# Patient Record
Sex: FEMALE | Race: WHITE | NOT HISPANIC OR LATINO | URBAN - METROPOLITAN AREA
[De-identification: names, ages, dates, MRNs, and addresses within clinical notes are randomized per-mention and may not be internally consistent; named-entity substitution may affect disease eponyms.]

---

## 2019-12-08 ENCOUNTER — EMERGENCY (EMERGENCY)
Facility: HOSPITAL | Age: 64
LOS: 1 days | Discharge: HOME | End: 2019-12-08
Attending: EMERGENCY MEDICINE | Admitting: EMERGENCY MEDICINE
Payer: MEDICAID

## 2019-12-08 VITALS
RESPIRATION RATE: 18 BRPM | OXYGEN SATURATION: 97 % | SYSTOLIC BLOOD PRESSURE: 187 MMHG | DIASTOLIC BLOOD PRESSURE: 99 MMHG | TEMPERATURE: 98 F | HEART RATE: 87 BPM

## 2019-12-08 VITALS
DIASTOLIC BLOOD PRESSURE: 82 MMHG | HEART RATE: 83 BPM | TEMPERATURE: 99 F | RESPIRATION RATE: 17 BRPM | SYSTOLIC BLOOD PRESSURE: 162 MMHG | OXYGEN SATURATION: 98 %

## 2019-12-08 LAB
ALBUMIN SERPL ELPH-MCNC: 3.6 G/DL — SIGNIFICANT CHANGE UP (ref 3.3–5)
ALP SERPL-CCNC: 117 U/L — SIGNIFICANT CHANGE UP (ref 40–120)
ALT FLD-CCNC: 16 U/L — SIGNIFICANT CHANGE UP (ref 10–45)
ANION GAP SERPL CALC-SCNC: 11 MMOL/L — SIGNIFICANT CHANGE UP (ref 5–17)
AST SERPL-CCNC: 15 U/L — SIGNIFICANT CHANGE UP (ref 10–40)
BASOPHILS # BLD AUTO: 0.06 K/UL — SIGNIFICANT CHANGE UP (ref 0–0.2)
BASOPHILS NFR BLD AUTO: 0.5 % — SIGNIFICANT CHANGE UP (ref 0–2)
BILIRUB SERPL-MCNC: <0.2 MG/DL — SIGNIFICANT CHANGE UP (ref 0.2–1.2)
BUN SERPL-MCNC: 23 MG/DL — SIGNIFICANT CHANGE UP (ref 7–23)
CALCIUM SERPL-MCNC: 9.2 MG/DL — SIGNIFICANT CHANGE UP (ref 8.4–10.5)
CHLORIDE SERPL-SCNC: 109 MMOL/L — HIGH (ref 96–108)
CO2 SERPL-SCNC: 26 MMOL/L — SIGNIFICANT CHANGE UP (ref 22–31)
CREAT SERPL-MCNC: 0.41 MG/DL — LOW (ref 0.5–1.3)
EOSINOPHIL # BLD AUTO: 0.14 K/UL — SIGNIFICANT CHANGE UP (ref 0–0.5)
EOSINOPHIL NFR BLD AUTO: 1.2 % — SIGNIFICANT CHANGE UP (ref 0–6)
GLUCOSE SERPL-MCNC: 109 MG/DL — HIGH (ref 70–99)
HCT VFR BLD CALC: 37.5 % — SIGNIFICANT CHANGE UP (ref 34.5–45)
HGB BLD-MCNC: 11.5 G/DL — SIGNIFICANT CHANGE UP (ref 11.5–15.5)
IMM GRANULOCYTES NFR BLD AUTO: 0.3 % — SIGNIFICANT CHANGE UP (ref 0–1.5)
LYMPHOCYTES # BLD AUTO: 2.48 K/UL — SIGNIFICANT CHANGE UP (ref 1–3.3)
LYMPHOCYTES # BLD AUTO: 20.6 % — SIGNIFICANT CHANGE UP (ref 13–44)
MCHC RBC-ENTMCNC: 26.1 PG — LOW (ref 27–34)
MCHC RBC-ENTMCNC: 30.7 GM/DL — LOW (ref 32–36)
MCV RBC AUTO: 85.2 FL — SIGNIFICANT CHANGE UP (ref 80–100)
MONOCYTES # BLD AUTO: 0.97 K/UL — HIGH (ref 0–0.9)
MONOCYTES NFR BLD AUTO: 8.1 % — SIGNIFICANT CHANGE UP (ref 2–14)
NEUTROPHILS # BLD AUTO: 8.33 K/UL — HIGH (ref 1.8–7.4)
NEUTROPHILS NFR BLD AUTO: 69.3 % — SIGNIFICANT CHANGE UP (ref 43–77)
NRBC # BLD: 0 /100 WBCS — SIGNIFICANT CHANGE UP (ref 0–0)
PLATELET # BLD AUTO: 282 K/UL — SIGNIFICANT CHANGE UP (ref 150–400)
POTASSIUM SERPL-MCNC: 3.9 MMOL/L — SIGNIFICANT CHANGE UP (ref 3.5–5.3)
POTASSIUM SERPL-SCNC: 3.9 MMOL/L — SIGNIFICANT CHANGE UP (ref 3.5–5.3)
PROT SERPL-MCNC: 6.3 G/DL — SIGNIFICANT CHANGE UP (ref 6–8.3)
RBC # BLD: 4.4 M/UL — SIGNIFICANT CHANGE UP (ref 3.8–5.2)
RBC # FLD: 14.1 % — SIGNIFICANT CHANGE UP (ref 10.3–14.5)
SODIUM SERPL-SCNC: 146 MMOL/L — HIGH (ref 135–145)
TROPONIN T SERPL-MCNC: <0.01 NG/ML — SIGNIFICANT CHANGE UP (ref 0–0.01)
WBC # BLD: 12.02 K/UL — HIGH (ref 3.8–10.5)
WBC # FLD AUTO: 12.02 K/UL — HIGH (ref 3.8–10.5)

## 2019-12-08 PROCEDURE — 84484 ASSAY OF TROPONIN QUANT: CPT

## 2019-12-08 PROCEDURE — 71046 X-RAY EXAM CHEST 2 VIEWS: CPT | Mod: 26

## 2019-12-08 PROCEDURE — 93010 ELECTROCARDIOGRAM REPORT: CPT

## 2019-12-08 PROCEDURE — 71046 X-RAY EXAM CHEST 2 VIEWS: CPT

## 2019-12-08 PROCEDURE — 85025 COMPLETE CBC W/AUTO DIFF WBC: CPT

## 2019-12-08 PROCEDURE — 80053 COMPREHEN METABOLIC PANEL: CPT

## 2019-12-08 PROCEDURE — 93005 ELECTROCARDIOGRAM TRACING: CPT

## 2019-12-08 PROCEDURE — 99283 EMERGENCY DEPT VISIT LOW MDM: CPT | Mod: 25

## 2019-12-08 PROCEDURE — 36415 COLL VENOUS BLD VENIPUNCTURE: CPT

## 2019-12-08 PROCEDURE — 99285 EMERGENCY DEPT VISIT HI MDM: CPT

## 2019-12-08 NOTE — ED ADULT NURSE NOTE - OBJECTIVE STATEMENT
Received a 64 year old female with a chief complaint of shortness of breath. Patient presents to the Ed in no acute distress. Patient denies chest pain.

## 2019-12-08 NOTE — ED ADULT NURSE NOTE - NSIMPLEMENTINTERV_GEN_ALL_ED
Implemented All Universal Safety Interventions:  [place] to call system. Call bell, personal items and telephone within reach. Instruct patient to call for assistance. Room bathroom lighting operational. Non-slip footwear when patient is off stretcher. Physically safe environment: no spills, clutter or unnecessary equipment. Stretcher in lowest position, wheels locked, appropriate side rails in place.

## 2019-12-08 NOTE — ED ADULT TRIAGE NOTE - CHIEF COMPLAINT QUOTE
pt. c/o shortness of breath that woke her up tonight, denies chest pain, acute extremities swelling, fever, cough, dizziness. BP elevated in triage, pt. missed her evening amlodipine dose.

## 2019-12-08 NOTE — ED PROVIDER NOTE - CLINICAL SUMMARY MEDICAL DECISION MAKING FREE TEXT BOX
64F PMh chronic back pain, possible HTN (was prescribed amlodpine but then told to stop taking it a few mos ago) p/w SOB. Was feeling like usual self prior to going to sleep. Awoke in middle of night gasping for breath, lasted a few min and quickly resolved. Went back to sleep and had a similar episode. Currently asymptomatic. Was told a few mos ago that she may have sleep apnea, snores at night. Had routine stress test ~7mos ago wnl. No other systemic symptoms. Hypertensive, other vitals wnl. Exam as above.  ddx: Likely sleep apnea. Clinically not acute cardiac pathology.   cbc/cmp/trop sent prior to my eval. CXR.  Reassess.

## 2019-12-08 NOTE — ED PROVIDER NOTE - PROGRESS NOTE DETAILS
Klepfish: Pt remains asymptomatic. Labs grossly wnl. CXR wnl. Clinically no indication for further emergent ED workup or hospitalization at this time. comfortable for dc, outpt pmd/cards/pulm f/u.

## 2019-12-08 NOTE — ED PROVIDER NOTE - OBJECTIVE STATEMENT
64F PMh chronic back pain, possible HTN (was prescribed amlodpine but then told to stop taking it a few mos ago) p/w SOB. Was feeling like usual self prior to going to sleep. Awoke in middle of night gasping for breath, lasted a few min and quickly resolved. Went back to sleep and had a similar episode. Currently asymptomatic. Was told a few mos ago that she may have sleep apnea, snores at night. Had routine stress test ~7mos ago wnl. Denies associated cp, NVD, lightheaded, diaphoresis, palpitations, cough/rhinorrhea, black/bloody stool, LE pain/swelling, focal weakness/numbness, recent travel/immobilization, abd pain, urinary complaints, f/c. No hormone use. No FMH CAD/clots/sudden death.

## 2019-12-08 NOTE — ED PROVIDER NOTE - NSFOLLOWUPINSTRUCTIONS_ED_ALL_ED_FT
Return for fevers, persistent vomit, uncontrolled pain, worsening breathing, worsening lightheaded.  Follow up with primary doctor within 1-2 days.   Follow up with cardiologist. Can call [phone] ([facility]) to schedule appointment.   Follow up with pulmonologist. Can call [phone] to schedule appointment.     Shortness of breath    Shortness of breath (dyspnea) means you have trouble breathing and could indicate a medical problem. Causes include lung disease, heart disease, low amount of red blood cells (anemia), poor physical fitness, being overweight, smoking, etc. Your health care provider today may not be able to find a cause for your shortness of breath after your exam. In this case, it is important to have a follow-up exam with your primary care physician as instructed. If medicines were prescribed, take them as directed for the full length of time directed. Refrain from tobacco products.    SEEK IMMEDIATE MEDICAL CARE IF YOU HAVE ANY OF THE FOLLOWING SYMPTOMS: worsening shortness of breath, chest pain, back pain, abdominal pain, fever, coughing up blood, lightheadedness/dizziness.

## 2019-12-08 NOTE — ED PROVIDER NOTE - PATIENT PORTAL LINK FT
You can access the FollowMyHealth Patient Portal offered by [facility] by registering at the following website: [email or web address]. By joining [facility]’s FollowMyHealth portal, you will also be able to view your health information using other applications (apps) compatible with our system.

## 2019-12-12 DIAGNOSIS — R06.02 SHORTNESS OF BREATH: ICD-10-CM

## 2022-04-20 ENCOUNTER — INPATIENT (INPATIENT)
Facility: HOSPITAL | Age: 67
LOS: 1 days | Discharge: LEFT BEFORE DISCHARGE | DRG: 872 | End: 2022-04-22
Attending: SURGERY | Admitting: SURGERY
Payer: MEDICARE

## 2022-04-20 VITALS
OXYGEN SATURATION: 95 % | TEMPERATURE: 98 F | DIASTOLIC BLOOD PRESSURE: 92 MMHG | RESPIRATION RATE: 16 BRPM | WEIGHT: 169.98 LBS | HEIGHT: 67 IN | HEART RATE: 97 BPM | SYSTOLIC BLOOD PRESSURE: 173 MMHG

## 2022-04-20 LAB
ALBUMIN SERPL ELPH-MCNC: 3.4 G/DL — SIGNIFICANT CHANGE UP (ref 3.3–5)
ALBUMIN SERPL ELPH-MCNC: 3.9 G/DL — SIGNIFICANT CHANGE UP (ref 3.3–5)
ALP SERPL-CCNC: 335 U/L — HIGH (ref 40–120)
ALP SERPL-CCNC: 376 U/L — HIGH (ref 40–120)
ALT FLD-CCNC: 322 U/L — HIGH (ref 10–45)
ALT FLD-CCNC: 438 U/L — HIGH (ref 10–45)
ANION GAP SERPL CALC-SCNC: 12 MMOL/L — SIGNIFICANT CHANGE UP (ref 5–17)
ANION GAP SERPL CALC-SCNC: 12 MMOL/L — SIGNIFICANT CHANGE UP (ref 5–17)
APPEARANCE UR: CLEAR — SIGNIFICANT CHANGE UP
APTT BLD: 33.4 SEC — SIGNIFICANT CHANGE UP (ref 27.5–35.5)
AST SERPL-CCNC: 231 U/L — HIGH (ref 10–40)
AST SERPL-CCNC: 370 U/L — HIGH (ref 10–40)
BACTERIA # UR AUTO: ABNORMAL /HPF
BASOPHILS # BLD AUTO: 0.03 K/UL — SIGNIFICANT CHANGE UP (ref 0–0.2)
BASOPHILS NFR BLD AUTO: 0.2 % — SIGNIFICANT CHANGE UP (ref 0–2)
BILIRUB SERPL-MCNC: 3.4 MG/DL — HIGH (ref 0.2–1.2)
BILIRUB SERPL-MCNC: 4.1 MG/DL — HIGH (ref 0.2–1.2)
BILIRUB UR-MCNC: ABNORMAL
BLD GP AB SCN SERPL QL: NEGATIVE — SIGNIFICANT CHANGE UP
BUN SERPL-MCNC: 12 MG/DL — SIGNIFICANT CHANGE UP (ref 7–23)
BUN SERPL-MCNC: 13 MG/DL — SIGNIFICANT CHANGE UP (ref 7–23)
CALCIUM SERPL-MCNC: 8.4 MG/DL — SIGNIFICANT CHANGE UP (ref 8.4–10.5)
CALCIUM SERPL-MCNC: 9.4 MG/DL — SIGNIFICANT CHANGE UP (ref 8.4–10.5)
CHLORIDE SERPL-SCNC: 101 MMOL/L — SIGNIFICANT CHANGE UP (ref 96–108)
CHLORIDE SERPL-SCNC: 99 MMOL/L — SIGNIFICANT CHANGE UP (ref 96–108)
CO2 SERPL-SCNC: 22 MMOL/L — SIGNIFICANT CHANGE UP (ref 22–31)
CO2 SERPL-SCNC: 26 MMOL/L — SIGNIFICANT CHANGE UP (ref 22–31)
COLOR SPEC: YELLOW — SIGNIFICANT CHANGE UP
COMMENT - URINE: SIGNIFICANT CHANGE UP
CREAT SERPL-MCNC: 0.49 MG/DL — LOW (ref 0.5–1.3)
CREAT SERPL-MCNC: 0.52 MG/DL — SIGNIFICANT CHANGE UP (ref 0.5–1.3)
DIFF PNL FLD: NEGATIVE — SIGNIFICANT CHANGE UP
EGFR: 103 ML/MIN/1.73M2 — SIGNIFICANT CHANGE UP
EGFR: 105 ML/MIN/1.73M2 — SIGNIFICANT CHANGE UP
EOSINOPHIL # BLD AUTO: 0.02 K/UL — SIGNIFICANT CHANGE UP (ref 0–0.5)
EOSINOPHIL NFR BLD AUTO: 0.1 % — SIGNIFICANT CHANGE UP (ref 0–6)
EPI CELLS # UR: ABNORMAL /HPF (ref 0–5)
GLUCOSE SERPL-MCNC: 123 MG/DL — HIGH (ref 70–99)
GLUCOSE SERPL-MCNC: 171 MG/DL — HIGH (ref 70–99)
GLUCOSE UR QL: NEGATIVE — SIGNIFICANT CHANGE UP
HCT VFR BLD CALC: 45.3 % — HIGH (ref 34.5–45)
HGB BLD-MCNC: 14.6 G/DL — SIGNIFICANT CHANGE UP (ref 11.5–15.5)
IMM GRANULOCYTES NFR BLD AUTO: 0.4 % — SIGNIFICANT CHANGE UP (ref 0–1.5)
INR BLD: 1.41 — HIGH (ref 0.88–1.16)
KETONES UR-MCNC: ABNORMAL MG/DL
LACTATE SERPL-SCNC: 2.3 MMOL/L — HIGH (ref 0.5–2)
LACTATE SERPL-SCNC: 3.8 MMOL/L — HIGH (ref 0.5–2)
LEUKOCYTE ESTERASE UR-ACNC: ABNORMAL
LIDOCAIN IGE QN: 111 U/L — HIGH (ref 7–60)
LYMPHOCYTES # BLD AUTO: 0.93 K/UL — LOW (ref 1–3.3)
LYMPHOCYTES # BLD AUTO: 6.9 % — LOW (ref 13–44)
MCHC RBC-ENTMCNC: 28.1 PG — SIGNIFICANT CHANGE UP (ref 27–34)
MCHC RBC-ENTMCNC: 32.2 GM/DL — SIGNIFICANT CHANGE UP (ref 32–36)
MCV RBC AUTO: 87.1 FL — SIGNIFICANT CHANGE UP (ref 80–100)
MONOCYTES # BLD AUTO: 0.68 K/UL — SIGNIFICANT CHANGE UP (ref 0–0.9)
MONOCYTES NFR BLD AUTO: 5 % — SIGNIFICANT CHANGE UP (ref 2–14)
NEUTROPHILS # BLD AUTO: 11.75 K/UL — HIGH (ref 1.8–7.4)
NEUTROPHILS NFR BLD AUTO: 87.4 % — HIGH (ref 43–77)
NITRITE UR-MCNC: NEGATIVE — SIGNIFICANT CHANGE UP
NRBC # BLD: 0 /100 WBCS — SIGNIFICANT CHANGE UP (ref 0–0)
PH UR: 6.5 — SIGNIFICANT CHANGE UP (ref 5–8)
PLATELET # BLD AUTO: 258 K/UL — SIGNIFICANT CHANGE UP (ref 150–400)
POTASSIUM SERPL-MCNC: 3.2 MMOL/L — LOW (ref 3.5–5.3)
POTASSIUM SERPL-MCNC: 3.7 MMOL/L — SIGNIFICANT CHANGE UP (ref 3.5–5.3)
POTASSIUM SERPL-SCNC: 3.2 MMOL/L — LOW (ref 3.5–5.3)
POTASSIUM SERPL-SCNC: 3.7 MMOL/L — SIGNIFICANT CHANGE UP (ref 3.5–5.3)
PROT SERPL-MCNC: 5.6 G/DL — LOW (ref 6–8.3)
PROT SERPL-MCNC: 6.9 G/DL — SIGNIFICANT CHANGE UP (ref 6–8.3)
PROT UR-MCNC: NEGATIVE MG/DL — SIGNIFICANT CHANGE UP
PROTHROM AB SERPL-ACNC: 16.8 SEC — HIGH (ref 10.5–13.4)
RBC # BLD: 5.2 M/UL — SIGNIFICANT CHANGE UP (ref 3.8–5.2)
RBC # FLD: 13.3 % — SIGNIFICANT CHANGE UP (ref 10.3–14.5)
RBC CASTS # UR COMP ASSIST: < 5 /HPF — SIGNIFICANT CHANGE UP
RH IG SCN BLD-IMP: POSITIVE — SIGNIFICANT CHANGE UP
SARS-COV-2 RNA SPEC QL NAA+PROBE: SIGNIFICANT CHANGE UP
SODIUM SERPL-SCNC: 133 MMOL/L — LOW (ref 135–145)
SODIUM SERPL-SCNC: 139 MMOL/L — SIGNIFICANT CHANGE UP (ref 135–145)
SP GR SPEC: 1.01 — SIGNIFICANT CHANGE UP (ref 1–1.03)
UROBILINOGEN FLD QL: 1 E.U./DL — SIGNIFICANT CHANGE UP
WBC # BLD: 13.47 K/UL — HIGH (ref 3.8–10.5)
WBC # FLD AUTO: 13.47 K/UL — HIGH (ref 3.8–10.5)
WBC UR QL: ABNORMAL /HPF

## 2022-04-20 PROCEDURE — 76705 ECHO EXAM OF ABDOMEN: CPT | Mod: 26

## 2022-04-20 PROCEDURE — 99285 EMERGENCY DEPT VISIT HI MDM: CPT

## 2022-04-20 PROCEDURE — 74177 CT ABD & PELVIS W/CONTRAST: CPT | Mod: 26,MA

## 2022-04-20 PROCEDURE — 93010 ELECTROCARDIOGRAM REPORT: CPT

## 2022-04-20 RX ORDER — IOHEXOL 300 MG/ML
30 INJECTION, SOLUTION INTRAVENOUS ONCE
Refills: 0 | Status: COMPLETED | OUTPATIENT
Start: 2022-04-20 | End: 2022-04-20

## 2022-04-20 RX ORDER — ACETAMINOPHEN 500 MG
1000 TABLET ORAL ONCE
Refills: 0 | Status: COMPLETED | OUTPATIENT
Start: 2022-04-20 | End: 2022-04-20

## 2022-04-20 RX ORDER — SODIUM CHLORIDE 9 MG/ML
1000 INJECTION INTRAMUSCULAR; INTRAVENOUS; SUBCUTANEOUS ONCE
Refills: 0 | Status: COMPLETED | OUTPATIENT
Start: 2022-04-20 | End: 2022-04-20

## 2022-04-20 RX ORDER — ACETAMINOPHEN 500 MG
650 TABLET ORAL ONCE
Refills: 0 | Status: DISCONTINUED | OUTPATIENT
Start: 2022-04-20 | End: 2022-04-20

## 2022-04-20 RX ORDER — CHLORHEXIDINE GLUCONATE 213 G/1000ML
1 SOLUTION TOPICAL
Refills: 0 | Status: DISCONTINUED | OUTPATIENT
Start: 2022-04-20 | End: 2022-04-22

## 2022-04-20 RX ORDER — ONDANSETRON 8 MG/1
4 TABLET, FILM COATED ORAL ONCE
Refills: 0 | Status: COMPLETED | OUTPATIENT
Start: 2022-04-20 | End: 2022-04-20

## 2022-04-20 RX ORDER — PIPERACILLIN AND TAZOBACTAM 4; .5 G/20ML; G/20ML
3.38 INJECTION, POWDER, LYOPHILIZED, FOR SOLUTION INTRAVENOUS ONCE
Refills: 0 | Status: COMPLETED | OUTPATIENT
Start: 2022-04-20 | End: 2022-04-20

## 2022-04-20 RX ORDER — VANCOMYCIN HCL 1 G
1000 VIAL (EA) INTRAVENOUS ONCE
Refills: 0 | Status: COMPLETED | OUTPATIENT
Start: 2022-04-20 | End: 2022-04-20

## 2022-04-20 RX ORDER — MORPHINE SULFATE 50 MG/1
4 CAPSULE, EXTENDED RELEASE ORAL ONCE
Refills: 0 | Status: DISCONTINUED | OUTPATIENT
Start: 2022-04-20 | End: 2022-04-20

## 2022-04-20 RX ADMIN — MORPHINE SULFATE 4 MILLIGRAM(S): 50 CAPSULE, EXTENDED RELEASE ORAL at 19:56

## 2022-04-20 RX ADMIN — Medication 1000 MILLIGRAM(S): at 23:46

## 2022-04-20 RX ADMIN — ONDANSETRON 4 MILLIGRAM(S): 8 TABLET, FILM COATED ORAL at 19:34

## 2022-04-20 RX ADMIN — Medication 250 MILLIGRAM(S): at 23:59

## 2022-04-20 RX ADMIN — SODIUM CHLORIDE 1000 MILLILITER(S): 9 INJECTION INTRAMUSCULAR; INTRAVENOUS; SUBCUTANEOUS at 19:34

## 2022-04-20 RX ADMIN — Medication 400 MILLIGRAM(S): at 23:16

## 2022-04-20 RX ADMIN — MORPHINE SULFATE 4 MILLIGRAM(S): 50 CAPSULE, EXTENDED RELEASE ORAL at 19:35

## 2022-04-20 RX ADMIN — SODIUM CHLORIDE 1000 MILLILITER(S): 9 INJECTION INTRAMUSCULAR; INTRAVENOUS; SUBCUTANEOUS at 21:45

## 2022-04-20 RX ADMIN — PIPERACILLIN AND TAZOBACTAM 200 GRAM(S): 4; .5 INJECTION, POWDER, LYOPHILIZED, FOR SOLUTION INTRAVENOUS at 22:02

## 2022-04-20 RX ADMIN — IOHEXOL 30 MILLILITER(S): 300 INJECTION, SOLUTION INTRAVENOUS at 19:34

## 2022-04-20 RX ADMIN — SODIUM CHLORIDE 1000 MILLILITER(S): 9 INJECTION INTRAMUSCULAR; INTRAVENOUS; SUBCUTANEOUS at 23:15

## 2022-04-20 NOTE — ED PROVIDER NOTE - CLINICAL SUMMARY MEDICAL DECISION MAKING FREE TEXT BOX
67F PMH lyme disease (treated 6yrs ago), possible HTN (states she was taken off meds) p/w R mid abd pain, radiating to R flank, since yesterday, intermittent. +Nausea. Not similar to prior. No other systemic symptoms.   Hypertensive, other vitals wnl. Exam as above.  ddx: Story c/w renal colic but exam more tender than expected. Possible kalli vs. appy vs. colitis vs. obstruction.  Labs, IVF/symptom control, CT/US, reassess.

## 2022-04-20 NOTE — ED ADULT NURSE REASSESSMENT NOTE - NS ED NURSE REASSESS COMMENT FT1
Pt assisted into wheelchair and taken to ultrasound with transport. Pt took personal belongings including cane and purse.

## 2022-04-20 NOTE — ED ADULT NURSE NOTE - NS ED NURSE RECORD ANOTHER VITAL SIGN
All lines, monitors DC'd. Discharge instructions given. Pt given time to ask any questions. Pt ambulatory out of department in [employer] custody. All pt belongings in pt's possession. Pt verbalized understanding.      Yes

## 2022-04-20 NOTE — ED ADULT NURSE NOTE - OBJECTIVE STATEMENT
Pt is 67 y.o female client BIBA complaining of RLQ pain radiating to the flank since yesterday accompanied w/ nausea. Pt A&Ox4. Pt has a steady gait. Pt is able to communicate well in simple sentences. Pt denies any PMH. Pt denies chest pain, sob, fever, chills, urinary or GI sx, diarrhea, vomiting, HA, lightheadedness, dizziness, weakness tingling and numbness.

## 2022-04-20 NOTE — ED PROVIDER NOTE - OBJECTIVE STATEMENT
67F PMH lyme disease (treated 6yrs ago), possible HTN (states she was taken off meds) p/w R mid abd pain, radiating to R flank, since yesterday, intermittent. +Nausea. Not similar to prior. No other systemic symptoms.   took gabapentin w/o relief, no other pain meds.   Denies fevers, chills, vomiting, diarrhea, black stool, bloody stool, dysuria, hematuria, urinary frequency, focal weakness/numbness, lightheadedness, SOB, CP, rhinorrhea, nasal congestion, sore throat, cough. No change in sense of taste or smell. Normal PO intake, normal BMs.

## 2022-04-20 NOTE — ED PROVIDER NOTE - PROGRESS NOTE DETAILS
Klepfish: WBC 13, lactate 3.8, bili 1.4, alk phos 376, , , lipase 111. Pt currently at US. Will give additional IVF, reassess. Klebet[place]af[place]: US prelim showing "1.  Dilatation of the gallbladder lumen with mobile cholelithiasis and borderline thickening of the gallbladder wall. These findings are equivocal for acute cholecystitis. Further evaluation with HIDA scintigraphy can be considered. 2.  Mild dilatation of the common bile duct. Further evaluation with MRCP can be considered." Pt has improved abd pain, however is now tachycardic/febrile. Still w/ R abd ttp. CT/UA pending. Surgery consulted. GI consulted - requesting rpt CMP. Well appearing. Will reassess.  Pending: rpt labs, CT, consults, reassessment.   Updated pt. Klepfish: pt remains well appearing. No current abd pain. HR/temp improving. Repeat lactate improving. GI holding on any immediate intervention unless clinical status changes. Will admit sicu for further care.  CT results pending.

## 2022-04-20 NOTE — ED PROVIDER NOTE - PHYSICAL EXAMINATION
abd: soft, BS+, +RUQ/RLQ ttp, mild guarding, no rebound.   no LE edema, normal equal distal pulses, steady unassisted gait.

## 2022-04-20 NOTE — ED ADULT TRIAGE NOTE - CHIEF COMPLAINT QUOTE
Pt BIBEMS from home for evaluation of RLQ pain radiating to right flank area that began 1 day ago associated w/ nausea. Denies fever, chills, CP, SOB, vomiting, diarrhea, urinary sx.

## 2022-04-21 LAB
ALBUMIN SERPL ELPH-MCNC: 2.8 G/DL — LOW (ref 3.3–5)
ALBUMIN SERPL ELPH-MCNC: 2.9 G/DL — LOW (ref 3.3–5)
ALP SERPL-CCNC: 320 U/L — HIGH (ref 40–120)
ALP SERPL-CCNC: 322 U/L — HIGH (ref 40–120)
ALT FLD-CCNC: 276 U/L — HIGH (ref 10–45)
ALT FLD-CCNC: 276 U/L — HIGH (ref 10–45)
ANION GAP SERPL CALC-SCNC: 10 MMOL/L — SIGNIFICANT CHANGE UP (ref 5–17)
ANION GAP SERPL CALC-SCNC: 12 MMOL/L — SIGNIFICANT CHANGE UP (ref 5–17)
ANISOCYTOSIS BLD QL: SLIGHT — SIGNIFICANT CHANGE UP
AST SERPL-CCNC: 167 U/L — HIGH (ref 10–40)
AST SERPL-CCNC: 191 U/L — HIGH (ref 10–40)
BASOPHILS # BLD AUTO: 0 K/UL — SIGNIFICANT CHANGE UP (ref 0–0.2)
BASOPHILS NFR BLD AUTO: 0 % — SIGNIFICANT CHANGE UP (ref 0–2)
BILIRUB SERPL-MCNC: 3 MG/DL — HIGH (ref 0.2–1.2)
BILIRUB SERPL-MCNC: 3.2 MG/DL — HIGH (ref 0.2–1.2)
BUN SERPL-MCNC: 8 MG/DL — SIGNIFICANT CHANGE UP (ref 7–23)
BUN SERPL-MCNC: 9 MG/DL — SIGNIFICANT CHANGE UP (ref 7–23)
BURR CELLS BLD QL SMEAR: PRESENT — SIGNIFICANT CHANGE UP
CALCIUM SERPL-MCNC: 8.1 MG/DL — LOW (ref 8.4–10.5)
CALCIUM SERPL-MCNC: 8.4 MG/DL — SIGNIFICANT CHANGE UP (ref 8.4–10.5)
CHLORIDE SERPL-SCNC: 104 MMOL/L — SIGNIFICANT CHANGE UP (ref 96–108)
CHLORIDE SERPL-SCNC: 107 MMOL/L — SIGNIFICANT CHANGE UP (ref 96–108)
CO2 SERPL-SCNC: 20 MMOL/L — LOW (ref 22–31)
CO2 SERPL-SCNC: 22 MMOL/L — SIGNIFICANT CHANGE UP (ref 22–31)
CREAT SERPL-MCNC: 0.46 MG/DL — LOW (ref 0.5–1.3)
CREAT SERPL-MCNC: 0.49 MG/DL — LOW (ref 0.5–1.3)
DACRYOCYTES BLD QL SMEAR: SLIGHT — SIGNIFICANT CHANGE UP
EGFR: 105 ML/MIN/1.73M2 — SIGNIFICANT CHANGE UP
EGFR: 106 ML/MIN/1.73M2 — SIGNIFICANT CHANGE UP
EOSINOPHIL # BLD AUTO: 0 K/UL — SIGNIFICANT CHANGE UP (ref 0–0.5)
EOSINOPHIL NFR BLD AUTO: 0 % — SIGNIFICANT CHANGE UP (ref 0–6)
GLUCOSE SERPL-MCNC: 129 MG/DL — HIGH (ref 70–99)
GLUCOSE SERPL-MCNC: 97 MG/DL — SIGNIFICANT CHANGE UP (ref 70–99)
HCT VFR BLD CALC: 37.9 % — SIGNIFICANT CHANGE UP (ref 34.5–45)
HCV AB S/CO SERPL IA: 0.04 S/CO — SIGNIFICANT CHANGE UP
HCV AB SERPL-IMP: SIGNIFICANT CHANGE UP
HGB BLD-MCNC: 12.1 G/DL — SIGNIFICANT CHANGE UP (ref 11.5–15.5)
LACTATE SERPL-SCNC: 1.4 MMOL/L — SIGNIFICANT CHANGE UP (ref 0.5–2)
LYMPHOCYTES # BLD AUTO: 1.37 K/UL — SIGNIFICANT CHANGE UP (ref 1–3.3)
LYMPHOCYTES # BLD AUTO: 6.1 % — LOW (ref 13–44)
MAGNESIUM SERPL-MCNC: 1.5 MG/DL — LOW (ref 1.6–2.6)
MAGNESIUM SERPL-MCNC: 2.4 MG/DL — SIGNIFICANT CHANGE UP (ref 1.6–2.6)
MANUAL SMEAR VERIFICATION: SIGNIFICANT CHANGE UP
MCHC RBC-ENTMCNC: 28 PG — SIGNIFICANT CHANGE UP (ref 27–34)
MCHC RBC-ENTMCNC: 31.9 GM/DL — LOW (ref 32–36)
MCV RBC AUTO: 87.7 FL — SIGNIFICANT CHANGE UP (ref 80–100)
MICROCYTES BLD QL: SLIGHT — SIGNIFICANT CHANGE UP
MONOCYTES # BLD AUTO: 1.35 K/UL — HIGH (ref 0–0.9)
MONOCYTES NFR BLD AUTO: 6 % — SIGNIFICANT CHANGE UP (ref 2–14)
NEUTROPHILS # BLD AUTO: 19.8 K/UL — HIGH (ref 1.8–7.4)
NEUTROPHILS NFR BLD AUTO: 87.9 % — HIGH (ref 43–77)
NRBC # BLD: 0 /100 WBCS — SIGNIFICANT CHANGE UP (ref 0–0)
PHOSPHATE SERPL-MCNC: 2.1 MG/DL — LOW (ref 2.5–4.5)
PHOSPHATE SERPL-MCNC: 3.6 MG/DL — SIGNIFICANT CHANGE UP (ref 2.5–4.5)
PLAT MORPH BLD: NORMAL — SIGNIFICANT CHANGE UP
PLATELET # BLD AUTO: 207 K/UL — SIGNIFICANT CHANGE UP (ref 150–400)
POIKILOCYTOSIS BLD QL AUTO: SIGNIFICANT CHANGE UP
POTASSIUM SERPL-MCNC: 3.1 MMOL/L — LOW (ref 3.5–5.3)
POTASSIUM SERPL-MCNC: 4.1 MMOL/L — SIGNIFICANT CHANGE UP (ref 3.5–5.3)
POTASSIUM SERPL-SCNC: 3.1 MMOL/L — LOW (ref 3.5–5.3)
POTASSIUM SERPL-SCNC: 4.1 MMOL/L — SIGNIFICANT CHANGE UP (ref 3.5–5.3)
PROT SERPL-MCNC: 5.1 G/DL — LOW (ref 6–8.3)
PROT SERPL-MCNC: 5.4 G/DL — LOW (ref 6–8.3)
RBC # BLD: 4.32 M/UL — SIGNIFICANT CHANGE UP (ref 3.8–5.2)
RBC # FLD: 13.2 % — SIGNIFICANT CHANGE UP (ref 10.3–14.5)
RBC BLD AUTO: ABNORMAL
RH IG SCN BLD-IMP: POSITIVE — SIGNIFICANT CHANGE UP
SODIUM SERPL-SCNC: 136 MMOL/L — SIGNIFICANT CHANGE UP (ref 135–145)
SODIUM SERPL-SCNC: 139 MMOL/L — SIGNIFICANT CHANGE UP (ref 135–145)
WBC # BLD: 22.53 K/UL — HIGH (ref 3.8–10.5)
WBC # FLD AUTO: 22.53 K/UL — HIGH (ref 3.8–10.5)

## 2022-04-21 PROCEDURE — 99221 1ST HOSP IP/OBS SF/LOW 40: CPT

## 2022-04-21 PROCEDURE — 99233 SBSQ HOSP IP/OBS HIGH 50: CPT | Mod: GC

## 2022-04-21 PROCEDURE — 76856 US EXAM PELVIC COMPLETE: CPT | Mod: 26

## 2022-04-21 DEVICE — CATH 3 LUMEN EXTRACTIN BALLOON 15MM: Type: IMPLANTABLE DEVICE | Status: FUNCTIONAL

## 2022-04-21 DEVICE — GWIRE ANG VISIGLIDE 0.35X450CM: Type: IMPLANTABLE DEVICE | Status: FUNCTIONAL

## 2022-04-21 RX ORDER — MAGNESIUM SULFATE 500 MG/ML
2 VIAL (ML) INJECTION ONCE
Refills: 0 | Status: COMPLETED | OUTPATIENT
Start: 2022-04-21 | End: 2022-04-21

## 2022-04-21 RX ORDER — BENZOCAINE AND MENTHOL 5; 1 G/100ML; G/100ML
1 LIQUID ORAL EVERY 4 HOURS
Refills: 0 | Status: DISCONTINUED | OUTPATIENT
Start: 2022-04-21 | End: 2022-04-22

## 2022-04-21 RX ORDER — DIPHENHYDRAMINE HCL 50 MG
25 CAPSULE ORAL ONCE
Refills: 0 | Status: COMPLETED | OUTPATIENT
Start: 2022-04-21 | End: 2022-04-21

## 2022-04-21 RX ORDER — POTASSIUM PHOSPHATE, MONOBASIC POTASSIUM PHOSPHATE, DIBASIC 236; 224 MG/ML; MG/ML
30 INJECTION, SOLUTION INTRAVENOUS ONCE
Refills: 0 | Status: COMPLETED | OUTPATIENT
Start: 2022-04-21 | End: 2022-04-21

## 2022-04-21 RX ORDER — ACETAMINOPHEN 500 MG
1000 TABLET ORAL ONCE
Refills: 0 | Status: COMPLETED | OUTPATIENT
Start: 2022-04-21 | End: 2022-04-21

## 2022-04-21 RX ORDER — PIPERACILLIN AND TAZOBACTAM 4; .5 G/20ML; G/20ML
3.38 INJECTION, POWDER, LYOPHILIZED, FOR SOLUTION INTRAVENOUS EVERY 6 HOURS
Refills: 0 | Status: DISCONTINUED | OUTPATIENT
Start: 2022-04-21 | End: 2022-04-22

## 2022-04-21 RX ORDER — POTASSIUM CHLORIDE 20 MEQ
10 PACKET (EA) ORAL
Refills: 0 | Status: COMPLETED | OUTPATIENT
Start: 2022-04-21 | End: 2022-04-21

## 2022-04-21 RX ORDER — SODIUM CHLORIDE 9 MG/ML
1000 INJECTION, SOLUTION INTRAVENOUS
Refills: 0 | Status: DISCONTINUED | OUTPATIENT
Start: 2022-04-21 | End: 2022-04-22

## 2022-04-21 RX ORDER — HYDROMORPHONE HYDROCHLORIDE 2 MG/ML
0.5 INJECTION INTRAMUSCULAR; INTRAVENOUS; SUBCUTANEOUS EVERY 4 HOURS
Refills: 0 | Status: DISCONTINUED | OUTPATIENT
Start: 2022-04-21 | End: 2022-04-22

## 2022-04-21 RX ORDER — LABETALOL HCL 100 MG
10 TABLET ORAL ONCE
Refills: 0 | Status: COMPLETED | OUTPATIENT
Start: 2022-04-21 | End: 2022-04-21

## 2022-04-21 RX ORDER — BENZOCAINE AND MENTHOL 5; 1 G/100ML; G/100ML
1 LIQUID ORAL THREE TIMES A DAY
Refills: 0 | Status: DISCONTINUED | OUTPATIENT
Start: 2022-04-21 | End: 2022-04-21

## 2022-04-21 RX ORDER — HEPARIN SODIUM 5000 [USP'U]/ML
5000 INJECTION INTRAVENOUS; SUBCUTANEOUS ONCE
Refills: 0 | Status: COMPLETED | OUTPATIENT
Start: 2022-04-21 | End: 2022-04-21

## 2022-04-21 RX ADMIN — HEPARIN SODIUM 5000 UNIT(S): 5000 INJECTION INTRAVENOUS; SUBCUTANEOUS at 22:57

## 2022-04-21 RX ADMIN — SODIUM CHLORIDE 120 MILLILITER(S): 9 INJECTION, SOLUTION INTRAVENOUS at 02:02

## 2022-04-21 RX ADMIN — HYDROMORPHONE HYDROCHLORIDE 0.5 MILLIGRAM(S): 2 INJECTION INTRAMUSCULAR; INTRAVENOUS; SUBCUTANEOUS at 13:11

## 2022-04-21 RX ADMIN — Medication 25 GRAM(S): at 02:42

## 2022-04-21 RX ADMIN — Medication 100 MILLIEQUIVALENT(S): at 02:02

## 2022-04-21 RX ADMIN — Medication 100 MILLIEQUIVALENT(S): at 04:05

## 2022-04-21 RX ADMIN — Medication 1000 MILLIGRAM(S): at 05:40

## 2022-04-21 RX ADMIN — Medication 100 MILLIEQUIVALENT(S): at 05:07

## 2022-04-21 RX ADMIN — Medication 10 MILLIGRAM(S): at 22:05

## 2022-04-21 RX ADMIN — Medication 1000 MILLIGRAM(S): at 00:30

## 2022-04-21 RX ADMIN — Medication 1000 MILLIGRAM(S): at 20:16

## 2022-04-21 RX ADMIN — POTASSIUM PHOSPHATE, MONOBASIC POTASSIUM PHOSPHATE, DIBASIC 83.33 MILLIMOLE(S): 236; 224 INJECTION, SOLUTION INTRAVENOUS at 07:15

## 2022-04-21 RX ADMIN — PIPERACILLIN AND TAZOBACTAM 200 GRAM(S): 4; .5 INJECTION, POWDER, LYOPHILIZED, FOR SOLUTION INTRAVENOUS at 06:38

## 2022-04-21 RX ADMIN — HYDROMORPHONE HYDROCHLORIDE 0.5 MILLIGRAM(S): 2 INJECTION INTRAMUSCULAR; INTRAVENOUS; SUBCUTANEOUS at 13:26

## 2022-04-21 RX ADMIN — Medication 100 MILLIEQUIVALENT(S): at 06:13

## 2022-04-21 RX ADMIN — Medication 400 MILLIGRAM(S): at 20:01

## 2022-04-21 RX ADMIN — PIPERACILLIN AND TAZOBACTAM 200 GRAM(S): 4; .5 INJECTION, POWDER, LYOPHILIZED, FOR SOLUTION INTRAVENOUS at 11:57

## 2022-04-21 RX ADMIN — Medication 100 MILLIEQUIVALENT(S): at 03:10

## 2022-04-21 RX ADMIN — PIPERACILLIN AND TAZOBACTAM 200 GRAM(S): 4; .5 INJECTION, POWDER, LYOPHILIZED, FOR SOLUTION INTRAVENOUS at 17:35

## 2022-04-21 RX ADMIN — Medication 100 MILLIEQUIVALENT(S): at 07:15

## 2022-04-21 RX ADMIN — Medication 400 MILLIGRAM(S): at 05:21

## 2022-04-21 RX ADMIN — Medication 25 MILLIGRAM(S): at 22:57

## 2022-04-21 RX ADMIN — Medication 25 MILLIGRAM(S): at 05:12

## 2022-04-21 NOTE — CONSULT NOTE ADULT - ATTENDING COMMENTS
Kae Kelly [id number]  This is a 66 y/o female with h/o htn presenting with abdominal pain on the R flank and R mid abdomen pain associated with nausea.  T 103F, /min, wbc 13, T roseann 4.1, elevated Ast and Alt.  She has hepatic steatosis, CBD 8mm, dilated GB (+) cholelithiasis.  -acute abdominal pain  -acute cholecystis/cholangitis  -Sepsis without shock  >pain control with dilaudid  >obtain lactate level  >IVF  >f/u with GI  >c/w antibiotics  Please see the above note for the rest of the details.

## 2022-04-21 NOTE — PROGRESS NOTE ADULT - ASSESSMENT
Ms. Kelly is a 67 year old woman with hx of Lyme Disease and no other PMH presenting to the ED with a chief complaint of 1 day history of R sided abdominal pain. In the ED, pt noted to be tachycardic, febrile to 102, lactate of 3.8, leukocytotic to 13 with elevated Tbili, AST/ALT, AlkPhos. In the ED, patient is febrile to 103, sinus tachycardic to 113, normotensive, leukocytotic to 13, with elevated Tbili of 4.1 (Direct 3.6), elevated AST/ALT in the 300s. RUQ US demonstrating equivocal findings for cholecysitis, and CT demonstrating choledocolithiasis with dilation of proximal CBD and possible cholecystitis. Admitted to SICU for further hemodynamic monitoring in the context of sepsis and cholangitis.    NEURO: Dilaudid 0.5mg PRN for pain. IV Tylenol PRN for fevers.   CV: Normotensive. No cardiac history. No active cardiac issues.  PULM: RA  GI: NPO. LR @ 120. GI consulted; ERCP 4/21.  : No Stock. Voiding freely.Normal Cr.   HEME/ONC: Hb 14.6. No active issues.  ENDO: Holding mISS while NPO  ID: Cholangitis: Empiric Zosyn (4/21--); dc:/// Vanco x1 in ED (4/20)  PPX: SCD's. Holding SQH for poss. GI intervention.  LINES: PIVs  PT/OT: Not ordered; Ambulate OOB as tolerated   DISPO: Direct admit to SICU   Ms. Kelly is a 67 year old woman with hx of Lyme Disease and no other PMH presenting to the ED with a chief complaint of 1 day history of R sided abdominal pain. In the ED, pt noted to be tachycardic, febrile to 102, lactate of 3.8, leukocytotic to 13 with elevated Tbili, AST/ALT, AlkPhos. In the ED, patient is febrile to 103, sinus tachycardic to 113, normotensive, leukocytotic to 13, with elevated Tbili of 4.1 (Direct 3.6), elevated AST/ALT in the 300s. RUQ US demonstrating equivocal findings for cholecysitis, and CT demonstrating choledocolithiasis with dilation of proximal CBD and possible cholecystitis. Admitted to SICU for further hemodynamic monitoring in the context of sepsis and cholangitis. Plan for ERCP with GI today.    NEURO: Dilaudid 0.5mg PRN for pain. IV Tylenol PRN for fevers.   CV: Normotensive. No cardiac history. No active cardiac issues.  PULM: RA  GI: NPO. LR @ 120. GI consulted; ERCP 4/21.  : No Stock. Voiding freely.Normal Cr.   HEME/ONC: Hb 14.6. No active issues.  ENDO: Holding mISS while NPO  ID: Cholangitis: Empiric Zosyn (4/21--); dc:/// Vanco x1 in ED (4/20)  PPX: SCD's. Holding SQH for poss. GI intervention.  LINES: PIVs  PT/OT: Not ordered; Ambulate OOB as tolerated   DISPO: Direct admit to SICU

## 2022-04-21 NOTE — CHART NOTE - NSCHARTNOTEFT_GEN_A_CORE
Patient mentating normally, non-toxic appearing, abdominal exam with mild RUQ TTP. Lactate normalized, Tbili trending downward, although WBC increase to 22.5 from 13. Patient currently afebrile, non-tachycardic. Blood pressure has decreased into 100s systolic w/ MAP in 70s, from 170s systolic when patient presented to ED. Communicated this w/ GI, they will perform ERCP.
Patient had a/an ERCP with Dr. Qureshi in the Endoscopy Suite for cholangitis. Please refer to "Results" tab or paper chart for full report. ERCP with sphincterotomy and balloon sweep with extraction of scant pus and multiple stones. Occlusion cholangiogram without remaining filling defects.    RECOMMENDATIONS  Clear liquid diet tonight, advance in AM  Complete course of empiric antibiotics  Continued IV fluids and supportive care  Care otherwise as per primary surgical team

## 2022-04-21 NOTE — PROGRESS NOTE ADULT - SUBJECTIVE AND OBJECTIVE BOX
SUBJECTIVE: Patient seen and examined bedside. Pt reports feeling well with resolution of pain since admission. Denies nausea or vomiting. Remains NPO. Passing flatus, last BM overnight prior to admission. Mildly distressed about hospital admission in setting of a new job.     piperacillin/tazobactam IVPB.. 3.375 Gram(s) IV Intermittent every 6 hours      Vital Signs Last 24 Hrs  T(C): 36.7 (2022 05:00), Max: 39.6 (2022 21:14)  T(F): 98 (2022 05:00), Max: 103.3 (2022 21:14)  HR: 71 (2022 07:00) (71 - 116)  BP: 121/69 (2022 07:00) (104/56 - 173/92)  BP(mean): 89 (2022 07:00) (75 - 93)  RR: 22 (2022 07:00) (15 - 28)  SpO2: 98% (2022 07:00) (93% - 98%)  I&O's Detail    2022 07:01  -  2022 07:00  --------------------------------------------------------  IN:    IV PiggyBack: 166 mL    IV PiggyBack: 100 mL    IV PiggyBack: 150 mL    IV PiggyBack: 600 mL    Lactated Ringers: 720 mL  Total IN: 1736 mL    OUT:    Voided (mL): 350 mL  Total OUT: 350 mL    Total NET: 1386 mL      2022 07:01  -  2022 07:33  --------------------------------------------------------  IN:    IV PiggyBack: 83 mL  Total IN: 83 mL    OUT:  Total OUT: 0 mL    Total NET: 83 mL          General: NAD, resting comfortably in bed  Neuro: AAOx4, RASS -0. cranial nerves II-XII grossly intact  HEENT: MMM, no JVD, no LAD  C/V: NSR, no MRG, RRR  Pulm: Nonlabored breathing, no respiratory distress, CTAB  Abd: soft, nondistended, nontender, no rebound, no guarding, negative Trejo sign, nontympanic  : voids  Extrem: WWP, no edema, SCDs in place  Skin: no rashes      LABS:                        12.1   22.53 )-----------( 207      ( 2022 01:36 )             37.9     -    139  |  107  |  8   ----------------------------<  97  4.1   |  22  |  0.49<L>    Ca    8.4      2022 06:33  Phos  3.6       Mg     2.4         TPro  5.4<L>  /  Alb  2.9<L>  /  TBili  3.2<H>  /  DBili  x   /  AST  167<H>  /  ALT  276<H>  /  AlkPhos  322<H>      PT/INR - ( 2022 22:40 )   PT: 16.8 sec;   INR: 1.41          PTT - ( 2022 22:40 )  PTT:33.4 sec  Urinalysis Basic - ( 2022 22:43 )    Color: Yellow / Appearance: Clear / S.010 / pH: x  Gluc: x / Ketone: Trace mg/dL  / Bili: Moderate / Urobili: 1.0 E.U./dL   Blood: x / Protein: NEGATIVE mg/dL / Nitrite: NEGATIVE   Leuk Esterase: Trace / RBC: < 5 /HPF / WBC 5-10 /HPF   Sq Epi: x / Non Sq Epi: 5-10 /HPF / Bacteria: Many /HPF        RADIOLOGY & ADDITIONAL STUDIES:

## 2022-04-21 NOTE — PROGRESS NOTE ADULT - ATTENDING COMMENTS
66 yo F w/ hx of lyme disease p/w abdominal pain and fever associated with lactate and leukocytosis, increased Tbili, ALT, AST, AP, CT concerning for choledocolithiasis and dilation of CBD concerning for cholangitis. Leukocytosis notably increased today, but fever improved, pain significantly improved, plan for ERCP today w/ GI. Lactate has cleared, continue with LR while NPO. Monitor UOP.

## 2022-04-21 NOTE — CONSULT NOTE ADULT - SUBJECTIVE AND OBJECTIVE BOX
GASTROENTEROLOGY CONSULT NOTE  HPI:  GENERAL SURGERY ADMISSION NOTE    ACS ATTENDING: Dr. Giraldo    HPI per ED: 67F PMH lyme disease (treated 6yrs ago), possible HTN (states she was taken off meds) p/w R mid abd pain, radiating to R flank, since yesterday, intermittent. +Nausea. Not similar to prior. No other systemic symptoms. Took gabapentin w/o relief, no other pain meds.   Denies fevers, chills, vomiting, diarrhea, black stool, bloody stool, dysuria, hematuria, urinary frequency, focal weakness/numbness, lightheadedness, SOB, CP, rhinorrhea, nasal congestion, sore throat, cough. No change in sense of taste or smell. Normal PO intake, normal BMs.    SURGERY ADDENDUM: Patient seen and evaluated at the bedside by surgery resident and chief resident. Patient reports that she has a 1 day history of severe right sided abdominal pain. She denies any similar episodes of pain in the recent past, or any association of the pain with food intake. She is not having any nausea, has not vomited, is able to pass gas, had a BM this morning. She denies any subjective fevers, chills, hematochezia, melena, dysuria.     In the ED, patient is febrile to 103, sinus tachycardic to 113, normotensive, leukocytotic to 13, with elevated Tbili of 4.1 (Direct 3.6), elevated AST/ALT in the 300s.    PMH: Lyme Disease (chronic, diagnosed 6 years ago, has residual arthritis)  PSH: None  SocHx: None  Medications: Gabepentin 300mg PRN, nortryptiline 2.5  Allergies: NKDA    OBJECTIVE    ICU Vital Signs Last 24 Hrs  T(C): 37 (20 Apr 2022 23:25), Max: 39.6 (20 Apr 2022 21:14)  T(F): 98.6 (20 Apr 2022 23:25), Max: 103.3 (20 Apr 2022 21:14)  HR: 103 (20 Apr 2022 23:25) (97 - 116)  BP: 115/75 (20 Apr 2022 23:25) (115/75 - 173/92)  BP(mean): --  ABP: --  ABP(mean): --  RR: 18 (20 Apr 2022 23:25) (16 - 18)  SpO2: 94% (20 Apr 2022 23:25) (93% - 95%)      EXAM  Constitutional: Pleasant, conversational woman. Non-toxic appearing. Not in any acute distress. Not sick appearing.  Cardiac: NSR, sinus tachycardia on monitor  Respiratory: Equal and bilateral chest rise, no acute respiratory distress, normal work of breathing  Abdomen: Obese. Soft, NT, ND. No right hemiabdomen tenderness elicited on palpation. No rebound tenderness. No guarding. No Trejo sign. Normal bowel sounds.  Extremities: [facility]    LABS AND IMAGING                        14.6   13.47 )-----------( 258      ( 20 Apr 2022 19:30 )             45.3     INTERPRETATION:  Right upper quadrant ultrasound    History: Right upper quadrant pain.    Prior studies: None available.    Findings:    Liver: The liver is enlarged, measuring 21.8 cm in craniocaudal   dimension. Parenchymal echogenicity, consistent with a steatosis. There   are no focal hepatic lesions.    Bile ducts: There is no intrahepatic biliary ductal dilatation. Mild   dilatation of the common bile duct to 8 mm.    Gallbladder: Dilatation of the gallbladder lumen, measuring 4.4 cm in   short axis diameter. Mobile cholelithiasis. Borderline thickening of the   gallbladder wall. No pericholecystic fluid. Negative sonographic Trejo's   sign; however, evaluation is limited as patient had received analgesics.    Pancreas: Not well-visualized.    Right kidney: The right kidney is normal in size, measuring 10.8 cm in   length. There is normal right renal parenchymal thickness and   echogenicity.  There is no right hydronephrosis.  No renal mass is   identified. No renal stone is seen.    Ascites: There is no ascites in the right upper quadrant.    Vessels: The proximal portions of the aorta and inferior vena cava are   unremarkable. Normal hepatopetal blood flow demonstrated within main   portal vein. Hepatic veins are patent.    Impression:  1.  Dilatation of the gallbladder lumen with mobile cholelithiasis and   borderline thickening of the gallbladder wall. These findings are   equivocal for acute cholecystitis. Further evaluation with HIDA   scintigraphy can be considered.  2.  Mild dilatation of the common bile duct. Further evaluation with MRCP   can be considered.        ******PRELIMINARY REPORT******      ******PRELIMINARY REPORT******       ESPERANZA DUNN MD; Resident Radiologist      ASSESSMENT  Ms. Kelly is a 67 year old woman with hx of Lyme Disease and no other PMH presenting to the ED with a chief complaint of 1 day history of R sided abdominal pain. In the ED, pt noted to be tachycardic, febrile to 102, lactate of 3.8, leukocytotic to 13 with elevated Tbili, AST/ALT, AlkPhos. In the ED, patient is febrile to 103, sinus tachycardic to 113, normotensive, leukocytotic to 13, with elevated Tbili of 4.1 (Direct 3.6), elevated AST/ALT in the 300s. RUQ US demonstrating equivocal findings for cholecysitis, and CT demonstrating choledocolithiasis with dilation of proximal CBD and possible cholecystitis. Admitted to SICU for further hemodynamic monitoring in the context of sepsis and cholangitis.    PLAN  1. Will be admitted under Dr. Giraldo directly to SICU in the context of persistent tachycardia unresponsive to fluid boluses and possible sepsis.  2. Zosyn for IV antibiotics  3. CTAP ordered; f/u final read.  4. GI consult for elevated Tbili, AST/ALT, Alk phos for evaluation for possible cholangitis/choledocolithiasis.    Plan discussed with chief resident and ACS on call attending.  Keaton Mccabe MD PGY2  Surgery Consult Resident  (21 Apr 2022 00:11)    Allergies    No Known Allergies    Intolerances      Home Medications:    MEDICATIONS:  MEDICATIONS  (STANDING):  chlorhexidine 4% Liquid 1 Application(s) Topical <User Schedule>  lactated ringers. 1000 milliLiter(s) (120 mL/Hr) IV Continuous <Continuous>  piperacillin/tazobactam IVPB.. 3.375 Gram(s) IV Intermittent every 6 hours    MEDICATIONS  (PRN):  HYDROmorphone  Injectable 0.5 milliGRAM(s) IV Push every 4 hours PRN Severe Pain (7 - 10)    PAST MEDICAL & SURGICAL HISTORY:    FAMILY HISTORY:    SOCIAL HISTORY:  As above    REVIEW OF SYSTEMS:  CONSTITUTIONAL: No weakness, fevers or chills  HEENT: No visual changes; No vertigo or throat pain   NECK: No pain or stiffness  RESPIRATORY: No cough, wheezing, hemoptysis; No shortness of breath  CARDIOVASCULAR: No chest pain or palpitations  GASTROINTESTINAL: No abdominal or epigastric pain. No nausea, vomiting, or hematemesis; No diarrhea or constipation. No melena or hematochezia.  GENITOURINARY: No dysuria, frequency or hematuria  NEUROLOGICAL: No numbness or weakness  SKIN: No itching, burning, rashes, or lesions   All other 10 review of systems is negative unless indicated above.    Vital Signs Last 24 Hrs  T(C): 36.7 (21 Apr 2022 05:00), Max: 39.6 (20 Apr 2022 21:14)  T(F): 98 (21 Apr 2022 05:00), Max: 103.3 (20 Apr 2022 21:14)  HR: 71 (21 Apr 2022 07:00) (71 - 116)  BP: 121/69 (21 Apr 2022 07:00) (104/56 - 173/92)  BP(mean): 89 (21 Apr 2022 07:00) (75 - 93)  RR: 22 (21 Apr 2022 07:00) (15 - 28)  SpO2: 98% (21 Apr 2022 07:00) (93% - 98%)    04-20 @ 07:01  -  04-21 @ 07:00  --------------------------------------------------------  IN: 1736 mL / OUT: 350 mL / NET: 1386 mL    04-21 @ 07:01  -  04-21 @ 07:44  --------------------------------------------------------  IN: 83 mL / OUT: 0 mL / NET: 83 mL        PHYSICAL EXAM:    General: No acute distress  Eyes: Anicteric sclerae, moist conjunctivae  HENT: Moist mucous membranes  Neck: Trachea midline, supple  Lungs: Normal respiratory effort, no intercostal retractions  Cardiovascular: RRR  Abdomen: Soft, non-tender non-distended; Normal bowel sounds; No rebound or guarding  Extremities: Normal range of motion, No clubbing, cyanosis or edema  Neurological: Alert and oriented x3  Skin: Warm and dry. No obvious rash    LABS:                        12.1   22.53 )-----------( 207      ( 21 Apr 2022 01:36 )             37.9     04-21    139  |  107  |  8   ----------------------------<  97  4.1   |  22  |  0.49<L>    Ca    8.4      21 Apr 2022 06:33  Phos  3.6     04-21  Mg     2.4     04-21    TPro  5.4<L>  /  Alb  2.9<L>  /  TBili  3.2<H>  /  DBili  x   /  AST  167<H>  /  ALT  276<H>  /  AlkPhos  322<H>  04-21        PT/INR - ( 20 Apr 2022 22:40 )   PT: 16.8 sec;   INR: 1.41          PTT - ( 20 Apr 2022 22:40 )  PTT:33.4 sec    RADIOLOGY & ADDITIONAL STUDIES:     Reviewed

## 2022-04-21 NOTE — H&P ADULT - HISTORY OF PRESENT ILLNESS
GENERAL SURGERY ADMISSION NOTE    ACS ATTENDING: Dr. Giraldo    HPI per ED: 67F PMH lyme disease (treated 6yrs ago), possible HTN (states she was taken off meds) p/w R mid abd pain, radiating to R flank, since yesterday, intermittent. +Nausea. Not similar to prior. No other systemic symptoms. Took gabapentin w/o relief, no other pain meds.   Denies fevers, chills, vomiting, diarrhea, black stool, bloody stool, dysuria, hematuria, urinary frequency, focal weakness/numbness, lightheadedness, SOB, CP, rhinorrhea, nasal congestion, sore throat, cough. No change in sense of taste or smell. Normal PO intake, normal BMs.    SURGERY ADDENDUM: Patient seen and evaluated at the bedside by surgery resident and chief resident. Patient reports that she has a 1 day history of severe right sided abdominal pain. She denies any similar episodes of pain in the recent past, or any association of the pain with food intake. She is not having any nausea, has not vomited, is able to pass gas, had a BM this morning. She denies any subjective fevers, chills, hematochezia, melena, dysuria.     In the ED, patient is febrile to 103, sinus tachycardic to 113, normotensive, leukocytotic to 13, with elevated Tbili of 4.1 (Direct 3.6), elevated AST/ALT in the 300s.    PMH: Lyme Disease (chronic, diagnosed 6 years ago, has residual arthritis)  PSH: None  SocHx: None  Medications: Gabepentin 300mg PRN, nortryptiline 2.5  Allergies: NKDA    OBJECTIVE    ICU Vital Signs Last 24 Hrs  T(C): 37 (20 Apr 2022 23:25), Max: 39.6 (20 Apr 2022 21:14)  T(F): 98.6 (20 Apr 2022 23:25), Max: 103.3 (20 Apr 2022 21:14)  HR: 103 (20 Apr 2022 23:25) (97 - 116)  BP: 115/75 (20 Apr 2022 23:25) (115/75 - 173/92)  BP(mean): --  ABP: --  ABP(mean): --  RR: 18 (20 Apr 2022 23:25) (16 - 18)  SpO2: 94% (20 Apr 2022 23:25) (93% - 95%)      EXAM  Constitutional: Pleasant, conversational woman. Non-toxic appearing. Not in any acute distress. Not sick appearing.  Cardiac: NSR, sinus tachycardia on monitor  Respiratory: Equal and bilateral chest rise, no acute respiratory distress, normal work of breathing  Abdomen: Obese. Soft, NT, ND. No right hemiabdomen tenderness elicited on palpation. No rebound tenderness. No guarding. No Trejo sign. Normal bowel sounds.  Extremities: [facility]    LABS AND IMAGING                        14.6   13.47 )-----------( 258      ( 20 Apr 2022 19:30 )             45.3     INTERPRETATION:  Right upper quadrant ultrasound    History: Right upper quadrant pain.    Prior studies: None available.    Findings:    Liver: The liver is enlarged, measuring 21.8 cm in craniocaudal   dimension. Parenchymal echogenicity, consistent with a steatosis. There   are no focal hepatic lesions.    Bile ducts: There is no intrahepatic biliary ductal dilatation. Mild   dilatation of the common bile duct to 8 mm.    Gallbladder: Dilatation of the gallbladder lumen, measuring 4.4 cm in   short axis diameter. Mobile cholelithiasis. Borderline thickening of the   gallbladder wall. No pericholecystic fluid. Negative sonographic Trejo's   sign; however, evaluation is limited as patient had received analgesics.    Pancreas: Not well-visualized.    Right kidney: The right kidney is normal in size, measuring 10.8 cm in   length. There is normal right renal parenchymal thickness and   echogenicity.  There is no right hydronephrosis.  No renal mass is   identified. No renal stone is seen.    Ascites: There is no ascites in the right upper quadrant.    Vessels: The proximal portions of the aorta and inferior vena cava are   unremarkable. Normal hepatopetal blood flow demonstrated within main   portal vein. Hepatic veins are patent.    Impression:  1.  Dilatation of the gallbladder lumen with mobile cholelithiasis and   borderline thickening of the gallbladder wall. These findings are   equivocal for acute cholecystitis. Further evaluation with HIDA   scintigraphy can be considered.  2.  Mild dilatation of the common bile duct. Further evaluation with MRCP   can be considered.        ******PRELIMINARY REPORT******      ******PRELIMINARY REPORT******       ESPERANZA DUNN MD; Resident Radiologist      ASSESSMENT  Ms. Kelly is a 67 year old woman with hx of Lyme Disease and no other PMH presenting to the ED with a chief complaint of 1 day history of R sided abdominal pain. In the ED, pt noted to be tachycardic, febrile to 102, lactate of 3.8, leukocytotic to 13 with elevated Tbili, AST/ALT, AlkPhos. RUQUS demonstrating equivocal findings for cholecystitis.     PLAN  1. Will be admitted under Dr. Giraldo directly to SICU in the context of persistent tachycardia unresponsive to fluid boluses and possible sepsis.  2. Zosyn for IV antibiotics  3. CTAP ordered; f/u final read.  4. GI consult for elevated Tbili, AST/ALT, Alk phos for evaluation for possible cholangitis/choledocolithiasis.    Plan discussed with chief resident and ACS on call attending.  Keaton Mccabe MD PGY2  Surgery Consult Resident  GENERAL SURGERY ADMISSION NOTE    ACS ATTENDING: Dr. Giraldo    HPI per ED: 67F PMH lyme disease (treated 6yrs ago), possible HTN (states she was taken off meds) p/w R mid abd pain, radiating to R flank, since yesterday, intermittent. +Nausea. Not similar to prior. No other systemic symptoms. Took gabapentin w/o relief, no other pain meds.   Denies fevers, chills, vomiting, diarrhea, black stool, bloody stool, dysuria, hematuria, urinary frequency, focal weakness/numbness, lightheadedness, SOB, CP, rhinorrhea, nasal congestion, sore throat, cough. No change in sense of taste or smell. Normal PO intake, normal BMs.    SURGERY ADDENDUM: Patient seen and evaluated at the bedside by surgery resident and chief resident. Patient reports that she has a 1 day history of severe right sided abdominal pain. She denies any similar episodes of pain in the recent past, or any association of the pain with food intake. She is not having any nausea, has not vomited, is able to pass gas, had a BM this morning. She denies any subjective fevers, chills, hematochezia, melena, dysuria.     In the ED, patient is febrile to 103, sinus tachycardic to 113, normotensive, leukocytotic to 13, with elevated Tbili of 4.1 (Direct 3.6), elevated AST/ALT in the 300s.    PMH: Lyme Disease (chronic, diagnosed 6 years ago, has residual arthritis)  PSH: None  SocHx: None  Medications: Gabepentin 300mg PRN, nortryptiline 2.5  Allergies: NKDA    OBJECTIVE    ICU Vital Signs Last 24 Hrs  T(C): 37 (20 Apr 2022 23:25), Max: 39.6 (20 Apr 2022 21:14)  T(F): 98.6 (20 Apr 2022 23:25), Max: 103.3 (20 Apr 2022 21:14)  HR: 103 (20 Apr 2022 23:25) (97 - 116)  BP: 115/75 (20 Apr 2022 23:25) (115/75 - 173/92)  BP(mean): --  ABP: --  ABP(mean): --  RR: 18 (20 Apr 2022 23:25) (16 - 18)  SpO2: 94% (20 Apr 2022 23:25) (93% - 95%)      EXAM  Constitutional: Pleasant, conversational woman. Non-toxic appearing. Not in any acute distress. Not sick appearing.  Cardiac: NSR, sinus tachycardia on monitor  Respiratory: Equal and bilateral chest rise, no acute respiratory distress, normal work of breathing  Abdomen: Obese. Soft, NT, ND. No right hemiabdomen tenderness elicited on palpation. No rebound tenderness. No guarding. No Trejo sign. Normal bowel sounds.  Extremities: [facility]    LABS AND IMAGING                        14.6   13.47 )-----------( 258      ( 20 Apr 2022 19:30 )             45.3     INTERPRETATION:  Right upper quadrant ultrasound    History: Right upper quadrant pain.    Prior studies: None available.    Findings:    Liver: The liver is enlarged, measuring 21.8 cm in craniocaudal   dimension. Parenchymal echogenicity, consistent with a steatosis. There   are no focal hepatic lesions.    Bile ducts: There is no intrahepatic biliary ductal dilatation. Mild   dilatation of the common bile duct to 8 mm.    Gallbladder: Dilatation of the gallbladder lumen, measuring 4.4 cm in   short axis diameter. Mobile cholelithiasis. Borderline thickening of the   gallbladder wall. No pericholecystic fluid. Negative sonographic Trejo's   sign; however, evaluation is limited as patient had received analgesics.    Pancreas: Not well-visualized.    Right kidney: The right kidney is normal in size, measuring 10.8 cm in   length. There is normal right renal parenchymal thickness and   echogenicity.  There is no right hydronephrosis.  No renal mass is   identified. No renal stone is seen.    Ascites: There is no ascites in the right upper quadrant.    Vessels: The proximal portions of the aorta and inferior vena cava are   unremarkable. Normal hepatopetal blood flow demonstrated within main   portal vein. Hepatic veins are patent.    Impression:  1.  Dilatation of the gallbladder lumen with mobile cholelithiasis and   borderline thickening of the gallbladder wall. These findings are   equivocal for acute cholecystitis. Further evaluation with HIDA   scintigraphy can be considered.  2.  Mild dilatation of the common bile duct. Further evaluation with MRCP   can be considered.        ******PRELIMINARY REPORT******      ******PRELIMINARY REPORT******       ESPERANZA DUNN MD; Resident Radiologist      ASSESSMENT  Ms. Kelly is a 67 year old woman with hx of Lyme Disease and no other PMH presenting to the ED with a chief complaint of 1 day history of R sided abdominal pain. In the ED, pt noted to be tachycardic, febrile to 102, lactate of 3.8, leukocytotic to 13 with elevated Tbili, AST/ALT, AlkPhos. In the ED, patient is febrile to 103, sinus tachycardic to 113, normotensive, leukocytotic to 13, with elevated Tbili of 4.1 (Direct 3.6), elevated AST/ALT in the 300s. RUQ US demonstrating equivocal findings for cholecysitis, and CT demonstrating choledocolithiasis with dilation of proximal CBD and possible cholecystitis. Admitted to SICU for further hemodynamic monitoring in the context of sepsis and cholangitis.    PLAN  1. Will be admitted under Dr. Giraldo directly to SICU in the context of persistent tachycardia unresponsive to fluid boluses and possible sepsis.  2. Zosyn for IV antibiotics  3. CTAP ordered; f/u final read.  4. GI consult for elevated Tbili, AST/ALT, Alk phos for evaluation for possible cholangitis/choledocolithiasis.    Plan discussed with chief resident and ACS on call attending.  Keaton Mccabe MD PGY2  Surgery Consult Resident

## 2022-04-21 NOTE — PROGRESS NOTE ADULT - SUBJECTIVE AND OBJECTIVE BOX
SUBJECTIVE: Patient seen and examined bedside.    piperacillin/tazobactam IVPB.. 3.375 Gram(s) IV Intermittent every 6 hours      Vital Signs Last 24 Hrs  T(C): 36.8 (2022 12:00), Max: 39.6 (2022 21:14)  T(F): 98.3 (2022 12:00), Max: 103.3 (2022 21:14)  HR: 72 (2022 13:00) (71 - 116)  BP: 139/65 (2022 13:00) (104/56 - 173/92)  BP(mean): 94 (2022 13:00) (75 - 102)  RR: 21 (2022 13:00) (15 - 30)  SpO2: 94% (2022 13:00) (93% - 98%)  I&O's Detail    2022 07:01  -  2022 07:00  --------------------------------------------------------  IN:    IV PiggyBack: 100 mL    IV PiggyBack: 150 mL    IV PiggyBack: 600 mL    IV PiggyBack: 166 mL    Lactated Ringers: 720 mL  Total IN: 1736 mL    OUT:    Voided (mL): 350 mL  Total OUT: 350 mL    Total NET: 1386 mL      2022 07:01  -  2022 13:26  --------------------------------------------------------  IN:    IV PiggyBack: 100 mL    IV PiggyBack: 415 mL    Lactated Ringers: 360 mL  Total IN: 875 mL    OUT:  Total OUT: 0 mL    Total NET: 875 mL          General: NAD, resting comfortably in bed  C/V: NSR  Pulm: Nonlabored breathing, no respiratory distress  Abd: soft, NT/ND.  Extrem: WWP, no edema, SCDs in place        LABS:                        12.1   22.53 )-----------( 207      ( 2022 01:36 )             37.9     04-    139  |  107  |  8   ----------------------------<  97  4.1   |  22  |  0.49<L>    Ca    8.4      2022 06:33  Phos  3.6     -  Mg     2.4         TPro  5.4<L>  /  Alb  2.9<L>  /  TBili  3.2<H>  /  DBili  x   /  AST  167<H>  /  ALT  276<H>  /  AlkPhos  322<H>  04-21    PT/INR - ( 2022 22:40 )   PT: 16.8 sec;   INR: 1.41          PTT - ( 2022 22:40 )  PTT:33.4 sec  Urinalysis Basic - ( 2022 22:43 )    Color: Yellow / Appearance: Clear / S.010 / pH: x  Gluc: x / Ketone: Trace mg/dL  / Bili: Moderate / Urobili: 1.0 E.U./dL   Blood: x / Protein: NEGATIVE mg/dL / Nitrite: NEGATIVE   Leuk Esterase: Trace / RBC: < 5 /HPF / WBC 5-10 /HPF   Sq Epi: x / Non Sq Epi: 5-10 /HPF / Bacteria: Many /HPF        RADIOLOGY & ADDITIONAL STUDIES:   SUBJECTIVE: Patient seen and examined bedside. Patient feels well, no abdominal pain. Passing gas. No nausea or emesis. No fever overnight.     piperacillin/tazobactam IVPB.. 3.375 Gram(s) IV Intermittent every 6 hours      Vital Signs Last 24 Hrs  T(C): 36.8 (2022 12:00), Max: 39.6 (2022 21:14)  T(F): 98.3 (2022 12:00), Max: 103.3 (2022 21:14)  HR: 72 (2022 13:00) (71 - 116)  BP: 139/65 (2022 13:00) (104/56 - 173/92)  BP(mean): 94 (2022 13:00) (75 - 102)  RR: 21 (2022 13:00) (15 - 30)  SpO2: 94% (2022 13:00) (93% - 98%)  I&O's Detail    2022 07:01  -  2022 07:00  --------------------------------------------------------  IN:    IV PiggyBack: 100 mL    IV PiggyBack: 150 mL    IV PiggyBack: 600 mL    IV PiggyBack: 166 mL    Lactated Ringers: 720 mL  Total IN: 1736 mL    OUT:    Voided (mL): 350 mL  Total OUT: 350 mL    Total NET: 1386 mL      2022 07:01  -  2022 13:26  --------------------------------------------------------  IN:    IV PiggyBack: 100 mL    IV PiggyBack: 415 mL    Lactated Ringers: 360 mL  Total IN: 875 mL    OUT:  Total OUT: 0 mL    Total NET: 875 mL      General: NAD, resting comfortably in bed  C/V: NSR  Pulm: Nonlabored breathing, no respiratory distress  Abd: soft, NT/ND. No peritoneal signs.   Extrem: WWP, no edema, SCDs in place        LABS:                        12.1   22.53 )-----------( 207      ( 2022 01:36 )             37.9         139  |  107  |  8   ----------------------------<  97  4.1   |  22  |  0.49<L>    Ca    8.4      2022 06:33  Phos  3.6       Mg     2.4         TPro  5.4<L>  /  Alb  2.9<L>  /  TBili  3.2<H>  /  DBili  x   /  AST  167<H>  /  ALT  276<H>  /  AlkPhos  322<H>      PT/INR - ( 2022 22:40 )   PT: 16.8 sec;   INR: 1.41          PTT - ( 2022 22:40 )  PTT:33.4 sec  Urinalysis Basic - ( 2022 22:43 )    Color: Yellow / Appearance: Clear / S.010 / pH: x  Gluc: x / Ketone: Trace mg/dL  / Bili: Moderate / Urobili: 1.0 E.U./dL   Blood: x / Protein: NEGATIVE mg/dL / Nitrite: NEGATIVE   Leuk Esterase: Trace / RBC: < 5 /HPF / WBC 5-10 /HPF   Sq Epi: x / Non Sq Epi: 5-10 /HPF / Bacteria: Many /HPF        RADIOLOGY & ADDITIONAL STUDIES:

## 2022-04-21 NOTE — PATIENT PROFILE ADULT - NSPRESCRUSEDDRG_GEN_A_NUR
Pradip Lorin presents today for a screening Mantoux Tuberculin Skin Test.    TB Screening Questionnaire    Pt denies sx of cough, coughing up blood, fever, weight loss, night sweats, or tiredness.    Pt needs TB test today for: work  Have you ever had a positive TB skin test or TB blood test? no  Have you ever had a severe reaction to a TB skin test? no  Have you ever taken medication for tuberculosis? no  What Country were you born in? [place]  Have you traveled outside or lived outside the [place] In the last 2 years? no  Have you been in contact with someone who has TB disease? no  Have you ever used injection drugs? no  Do you have HIV/AIDS? no  Do you have any diseases that could affect your immune system such as cancer, leukemia, or other? no  Do you have diabetes? no  Do you have severe kidney disease? no  Are you underweight, or do you have a disease which affects how you absorb food and nutrients? no  Have you had the BCG vaccine? no  Do you take any prescription medications? no    PPD placed on left forearm at 10:45 am    See Immunization activity for NDC & LOT information.    Patient instructed to return in 48 to 72 hours for reading.    Signed: Luther Rosas, CNP     No

## 2022-04-21 NOTE — PATIENT PROFILE ADULT - FALL HARM RISK - HARM RISK INTERVENTIONS

## 2022-04-21 NOTE — PROGRESS NOTE ADULT - ASSESSMENT
Ms. Kelly is a 67 year old woman with hx of Lyme Disease and no other PMH presenting to the ED with a chief complaint of 1 day history of R sided abdominal pain. In the ED, pt noted to be tachycardic, febrile to 102, lactate of 3.8, leukocytotic to 13 with elevated Tbili of 4.1 (Direct 3.6), elevated AST/ALT in the 300s. RUQ US demonstrating equivocal findings for cholecystitis, CBD 1cm and CT demonstrating choledocholithiasis with dilation of proximal CBD and possible acute cholecystitis. Admitted to SICU for further hemodynamic monitoring in the context of sepsis and cholangitis.    PLAN  - Patient requires ERCP for cholangitis as soon as possible   - NPO/IVF  - Zosyn for IV antibiotics  - Continue management in ICU    Patient discussed with Dr. Giraldo and chief residents

## 2022-04-21 NOTE — CONSULT NOTE ADULT - SUBJECTIVE AND OBJECTIVE BOX
SICU ADMISSION NOTE    ACS ATTENDING: Dr. Giraldo  SICU ATTENDING: Dr. Patterson    HPI per ED: 67F PMH lyme disease (treated 6yrs ago), possible HTN (states she was taken off meds) p/w R mid abd pain, radiating to R flank, since yesterday, intermittent. +Nausea. Not similar to prior. No other systemic symptoms. Took gabapentin w/o relief, no other pain meds.   Denies fevers, chills, vomiting, diarrhea, black stool, bloody stool, dysuria, hematuria, urinary frequency, focal weakness/numbness, lightheadedness, SOB, CP, rhinorrhea, nasal congestion, sore throat, cough. No change in sense of taste or smell. Normal PO intake, normal BMs.    SICU ADDENDUM: Patient seen and evaluated at the bedside by surgery resident and chief resident. Patient reports that she has a 1 day history of severe right sided abdominal pain. She denies any similar episodes of pain in the recent past, or any association of the pain with food intake. She is not having any nausea, has not vomited, is able to pass gas, had a BM this morning. She denies any subjective fevers, chills, hematochezia, melena, dysuria.     In the ED, patient is febrile to 103, sinus tachycardic to 113, normotensive, leukocytotic to 13, with elevated Tbili of 4.1 (Direct 3.6), elevated AST/ALT in the 300s.    PMH: Lyme Disease (chronic, diagnosed 6 years ago, has residual arthritis)  PSH: None  SocHx: None  Medications: Gabepentin 300mg PRN, nortryptiline 2.5  Allergies: NKDA    OBJECTIVE    ICU Vital Signs Last 24 Hrs  T(C): 37 (20 Apr 2022 23:25), Max: 39.6 (20 Apr 2022 21:14)  T(F): 98.6 (20 Apr 2022 23:25), Max: 103.3 (20 Apr 2022 21:14)  HR: 103 (20 Apr 2022 23:25) (97 - 116)  BP: 115/75 (20 Apr 2022 23:25) (115/75 - 173/92)  BP(mean): --  ABP: --  ABP(mean): --  RR: 18 (20 Apr 2022 23:25) (16 - 18)  SpO2: 94% (20 Apr 2022 23:25) (93% - 95%)      EXAM  Constitutional: Pleasant, conversational woman. Non-toxic appearing. Not in any acute distress. Not sick appearing.  Cardiac: NSR, sinus tachycardia on monitor  Respiratory: Equal and bilateral chest rise, no acute respiratory distress, normal work of breathing  Abdomen: Obese. Soft, NT, ND. No right hemiabdomen tenderness elicited on palpation. No rebound tenderness. No guarding. No Trejo sign. Normal bowel sounds.  Extremities: [facility]    LABS AND IMAGING                        14.6   13.47 )-----------( 258      ( 20 Apr 2022 19:30 )             45.3     INTERPRETATION:  Right upper quadrant ultrasound    History: Right upper quadrant pain.    Prior studies: None available.    Findings:    Liver: The liver is enlarged, measuring 21.8 cm in craniocaudal   dimension. Parenchymal echogenicity, consistent with a steatosis. There   are no focal hepatic lesions.    Bile ducts: There is no intrahepatic biliary ductal dilatation. Mild   dilatation of the common bile duct to 8 mm.    Gallbladder: Dilatation of the gallbladder lumen, measuring 4.4 cm in   short axis diameter. Mobile cholelithiasis. Borderline thickening of the   gallbladder wall. No pericholecystic fluid. Negative sonographic Trejo's   sign; however, evaluation is limited as patient had received analgesics.    Pancreas: Not well-visualized.    Right kidney: The right kidney is normal in size, measuring 10.8 cm in   length. There is normal right renal parenchymal thickness and   echogenicity.  There is no right hydronephrosis.  No renal mass is   identified. No renal stone is seen.    Ascites: There is no ascites in the right upper quadrant.    Vessels: The proximal portions of the aorta and inferior vena cava are   unremarkable. Normal hepatopetal blood flow demonstrated within main   portal vein. Hepatic veins are patent.    Impression:  1.  Dilatation of the gallbladder lumen with mobile cholelithiasis and   borderline thickening of the gallbladder wall. These findings are   equivocal for acute cholecystitis. Further evaluation with HIDA   scintigraphy can be considered.  2.  Mild dilatation of the common bile duct. Further evaluation with MRCP   can be considered.        ******PRELIMINARY REPORT******      ******PRELIMINARY REPORT******       ESPERANZA DUNN MD; Resident Radiologist      ASSESSMENT  Ms. Kelly is a 67 year old woman with hx of Lyme Disease and no other PMH presenting to the ED with a chief complaint of 1 day history of R sided abdominal pain. In the ED, pt noted to be tachycardic, febrile to 102, lactate of 3.8, leukocytotic to 13 with elevated Tbili, AST/ALT, AlkPhos. RUQUS demonstrating equivocal findings for cholecystitis.     PLAN  1. Will be admitted under Dr. Giraldo directly to SICU in the context of persistent tachycardia unresponsive to fluid boluses and possible sepsis.  2. Zosyn for IV antibiotics  3. CTAP ordered; f/u final read.  4. GI consult for elevated Tbili, AST/ALT, Alk phos for evaluation for possible cholangitis/choledocolithiasis.    Plan discussed with chief resident and ACS on call attending.  Keaton Mccabe MD PGY2  Surgery Consult Resident  SICU ADMISSION NOTE    ACS ATTENDING: Dr. Giraldo  SICU ATTENDING: Dr. Patterson    HPI per ED: 67F PMH lyme disease (treated 6yrs ago), possible HTN (states she was taken off meds) p/w R mid abd pain, radiating to R flank, since yesterday, intermittent. +Nausea. Not similar to prior. No other systemic symptoms. Took gabapentin w/o relief, no other pain meds.   Denies fevers, chills, vomiting, diarrhea, black stool, bloody stool, dysuria, hematuria, urinary frequency, focal weakness/numbness, lightheadedness, SOB, CP, rhinorrhea, nasal congestion, sore throat, cough. No change in sense of taste or smell. Normal PO intake, normal BMs.    SICU ADDENDUM: Patient seen and evaluated at the bedside by surgery resident and chief resident. Patient reports that she has a 1 day history of severe right sided abdominal pain. She denies any similar episodes of pain in the recent past, or any association of the pain with food intake. She is not having any nausea, has not vomited, is able to pass gas, had a BM this morning. She denies any subjective fevers, chills, hematochezia, melena, dysuria.     In the ED, patient is febrile to 103, sinus tachycardic to 113, normotensive, leukocytotic to 13, with elevated Tbili of 4.1 (Direct 3.6), elevated AST/ALT in the 300s.    PMH: Lyme Disease (chronic, diagnosed 6 years ago, has residual arthritis)  PSH: None  SocHx: None  Medications: Gabepentin 300mg PRN, nortryptiline 2.5  Allergies: NKDA    OBJECTIVE    ICU Vital Signs Last 24 Hrs  T(C): 37 (20 Apr 2022 23:25), Max: 39.6 (20 Apr 2022 21:14)  T(F): 98.6 (20 Apr 2022 23:25), Max: 103.3 (20 Apr 2022 21:14)  HR: 103 (20 Apr 2022 23:25) (97 - 116)  BP: 115/75 (20 Apr 2022 23:25) (115/75 - 173/92)  BP(mean): --  ABP: --  ABP(mean): --  RR: 18 (20 Apr 2022 23:25) (16 - 18)  SpO2: 94% (20 Apr 2022 23:25) (93% - 95%)      EXAM  Constitutional: Pleasant, conversational woman. Non-toxic appearing. Not in any acute distress. Not sick appearing.  Cardiac: NSR, sinus tachycardia on monitor  Respiratory: Equal and bilateral chest rise, no acute respiratory distress, normal work of breathing  Abdomen: Obese. Soft, NT, ND. No right hemiabdomen tenderness elicited on palpation. No rebound tenderness. No guarding. No Trejo sign. Normal bowel sounds.  Extremities: [facility]    LABS AND IMAGING                        14.6   13.47 )-----------( 258      ( 20 Apr 2022 19:30 )             45.3     INTERPRETATION:  Right upper quadrant ultrasound    History: Right upper quadrant pain.    Prior studies: None available.    Findings:    Liver: The liver is enlarged, measuring 21.8 cm in craniocaudal   dimension. Parenchymal echogenicity, consistent with a steatosis. There   are no focal hepatic lesions.    Bile ducts: There is no intrahepatic biliary ductal dilatation. Mild   dilatation of the common bile duct to 8 mm.    Gallbladder: Dilatation of the gallbladder lumen, measuring 4.4 cm in   short axis diameter. Mobile cholelithiasis. Borderline thickening of the   gallbladder wall. No pericholecystic fluid. Negative sonographic Trejo's   sign; however, evaluation is limited as patient had received analgesics.    Pancreas: Not well-visualized.    Right kidney: The right kidney is normal in size, measuring 10.8 cm in   length. There is normal right renal parenchymal thickness and   echogenicity.  There is no right hydronephrosis.  No renal mass is   identified. No renal stone is seen.    Ascites: There is no ascites in the right upper quadrant.    Vessels: The proximal portions of the aorta and inferior vena cava are   unremarkable. Normal hepatopetal blood flow demonstrated within main   portal vein. Hepatic veins are patent.    Impression:  1.  Dilatation of the gallbladder lumen with mobile cholelithiasis and   borderline thickening of the gallbladder wall. These findings are   equivocal for acute cholecystitis. Further evaluation with HIDA   scintigraphy can be considered.  2.  Mild dilatation of the common bile duct. Further evaluation with MRCP   can be considered.        ******PRELIMINARY REPORT******      ******PRELIMINARY REPORT******       ESPERANZA DUNN MD; Resident Radiologist    ASSESSMENT  Ms. Kelly is a 67 year old woman with hx of Lyme Disease and no other PMH presenting to the ED with a chief complaint of 1 day history of R sided abdominal pain. In the ED, pt noted to be tachycardic, febrile to 102, lactate of 3.8, leukocytotic to 13 with elevated Tbili, AST/ALT, AlkPhos. In the ED, patient is febrile to 103, sinus tachycardic to 113, normotensive, leukocytotic to 13, with elevated Tbili of 4.1 (Direct 3.6), elevated AST/ALT in the 300s. RUQ US demonstrating equivocal findings for cholecysitis, and CT demonstrating choledocolithiasis with dilation of proximal CBD and possible cholecystitis. Admitted to SICU for further hemodynamic monitoring in the context of sepsis and cholangitis.    PLAN  NEURO: Dilaudid 0.5mg PRN for pain. IV Tylenol ATC for fevers.   CARDIAC: Normotensive. No cardiac history. No active cardiac issues.  RESPIRATORY: O2 sat 100% on RA. No active issues.  GI: NPO. S/P 3L IVF bolus in ED. LR @ 120. GI consulted; poss. ERCP 4/21.  : No Stock. Voiding freely.Normal Cr.   HEME/ONC: Hb 14.6. No active issues.  ID: Leukocytotic on admission (14K). Empiric Zosyn (4/21--)  DVT PPX: SCD's. Holding SQH for poss. GI intervention.  LINES:  DISPO: Direct admit to SICU      Plan discussed with chief resident and ACS on call attending.  Keaton Mccabe MD PGY2  Surgery Consult Resident

## 2022-04-21 NOTE — CONSULT NOTE ADULT - ASSESSMENT
67yF w/Hx of Lyme disease, HTN, presenting with mid abdominal pain radiating to the R for 1 day with intermittent nausea, presenting meeting severe sepsis criteria secondary to definite grade I cholangitis. GI consulted for cholangitis.    Cholangitis - Patient presenting with fever, leukocytosis, RUQ pain, and imaging with evidence of choledocholithiasis. Patient meeting criteria for definite grade I, or mild, cholangitis. Leukocytosis persists, but patient otherwise appears clinically responding to antibiotics and supportive care.  - Maintain NPO  - Continue antibiotics and supportive care  - Plan for ERCP today  - Care otherwise as per primary surgical team    Recommendations discussed with primary team  Case discussed with attending physician

## 2022-04-22 VITALS
SYSTOLIC BLOOD PRESSURE: 171 MMHG | DIASTOLIC BLOOD PRESSURE: 82 MMHG | OXYGEN SATURATION: 95 % | HEART RATE: 65 BPM | RESPIRATION RATE: 21 BRPM

## 2022-04-22 LAB
-  AMPICILLIN/SULBACTAM: SIGNIFICANT CHANGE UP
-  AMPICILLIN: SIGNIFICANT CHANGE UP
-  CEFAZOLIN: SIGNIFICANT CHANGE UP
-  CEFTRIAXONE: SIGNIFICANT CHANGE UP
-  CIPROFLOXACIN: SIGNIFICANT CHANGE UP
-  ERTAPENEM: SIGNIFICANT CHANGE UP
-  GENTAMICIN: SIGNIFICANT CHANGE UP
-  MEROPENEM: SIGNIFICANT CHANGE UP
-  NITROFURANTOIN: SIGNIFICANT CHANGE UP
-  PIPERACILLIN/TAZOBACTAM: SIGNIFICANT CHANGE UP
-  TOBRAMYCIN: SIGNIFICANT CHANGE UP
-  TRIMETHOPRIM/SULFAMETHOXAZOLE: SIGNIFICANT CHANGE UP
ALBUMIN SERPL ELPH-MCNC: 3.4 G/DL — SIGNIFICANT CHANGE UP (ref 3.3–5)
ALP SERPL-CCNC: 386 U/L — HIGH (ref 40–120)
ALT FLD-CCNC: 268 U/L — HIGH (ref 10–45)
ANION GAP SERPL CALC-SCNC: 13 MMOL/L — SIGNIFICANT CHANGE UP (ref 5–17)
AST SERPL-CCNC: 141 U/L — HIGH (ref 10–40)
BILIRUB DIRECT SERPL-MCNC: 2.2 MG/DL — HIGH (ref 0–0.3)
BILIRUB INDIRECT FLD-MCNC: 0.3 MG/DL — SIGNIFICANT CHANGE UP (ref 0.2–1)
BILIRUB SERPL-MCNC: 2.5 MG/DL — HIGH (ref 0.2–1.2)
BUN SERPL-MCNC: 7 MG/DL — SIGNIFICANT CHANGE UP (ref 7–23)
CALCIUM SERPL-MCNC: 9.1 MG/DL — SIGNIFICANT CHANGE UP (ref 8.4–10.5)
CHLORIDE SERPL-SCNC: 105 MMOL/L — SIGNIFICANT CHANGE UP (ref 96–108)
CO2 SERPL-SCNC: 22 MMOL/L — SIGNIFICANT CHANGE UP (ref 22–31)
CREAT SERPL-MCNC: 0.38 MG/DL — LOW (ref 0.5–1.3)
EGFR: 111 ML/MIN/1.73M2 — SIGNIFICANT CHANGE UP
GLUCOSE SERPL-MCNC: 145 MG/DL — HIGH (ref 70–99)
HCT VFR BLD CALC: 38.3 % — SIGNIFICANT CHANGE UP (ref 34.5–45)
HGB BLD-MCNC: 12.3 G/DL — SIGNIFICANT CHANGE UP (ref 11.5–15.5)
MAGNESIUM SERPL-MCNC: 1.9 MG/DL — SIGNIFICANT CHANGE UP (ref 1.6–2.6)
MCHC RBC-ENTMCNC: 27.6 PG — SIGNIFICANT CHANGE UP (ref 27–34)
MCHC RBC-ENTMCNC: 32.1 GM/DL — SIGNIFICANT CHANGE UP (ref 32–36)
MCV RBC AUTO: 85.9 FL — SIGNIFICANT CHANGE UP (ref 80–100)
METHOD TYPE: SIGNIFICANT CHANGE UP
NRBC # BLD: 0 /100 WBCS — SIGNIFICANT CHANGE UP (ref 0–0)
PHOSPHATE SERPL-MCNC: 2.7 MG/DL — SIGNIFICANT CHANGE UP (ref 2.5–4.5)
PLATELET # BLD AUTO: 218 K/UL — SIGNIFICANT CHANGE UP (ref 150–400)
POTASSIUM SERPL-MCNC: 4.5 MMOL/L — SIGNIFICANT CHANGE UP (ref 3.5–5.3)
POTASSIUM SERPL-SCNC: 4.5 MMOL/L — SIGNIFICANT CHANGE UP (ref 3.5–5.3)
PROT SERPL-MCNC: 6.3 G/DL — SIGNIFICANT CHANGE UP (ref 6–8.3)
RBC # BLD: 4.46 M/UL — SIGNIFICANT CHANGE UP (ref 3.8–5.2)
RBC # FLD: 13.2 % — SIGNIFICANT CHANGE UP (ref 10.3–14.5)
SODIUM SERPL-SCNC: 140 MMOL/L — SIGNIFICANT CHANGE UP (ref 135–145)
WBC # BLD: 12.55 K/UL — HIGH (ref 3.8–10.5)
WBC # FLD AUTO: 12.55 K/UL — HIGH (ref 3.8–10.5)

## 2022-04-22 RX ADMIN — PIPERACILLIN AND TAZOBACTAM 200 GRAM(S): 4; .5 INJECTION, POWDER, LYOPHILIZED, FOR SOLUTION INTRAVENOUS at 00:13

## 2022-04-22 NOTE — PROVIDER CONTACT NOTE (OTHER) - ACTION/TREATMENT ORDERED:
AMA signed, pt safely accompanied to entrance to leave the hospital facility. PIV removed. Belongings given to pt. Accompanied pt to entrance w/ wheelchair to [employer].

## 2022-04-22 NOTE — PROVIDER CONTACT NOTE (OTHER) - ASSESSMENT
VSS unable to retrieved as pt removed BP cuff, pulse ox, tele. Pt changed into her own clothes and requesting for immediate leave.

## 2022-04-22 NOTE — PROVIDER CONTACT NOTE (OTHER) - SITUATION
Pt request to be discharged from hospital against medical advice. Request to sign AMA to leave hospital facility.

## 2022-04-25 LAB
CULTURE RESULTS: SIGNIFICANT CHANGE UP
ORGANISM # SPEC MICROSCOPIC CNT: SIGNIFICANT CHANGE UP
ORGANISM # SPEC MICROSCOPIC CNT: SIGNIFICANT CHANGE UP
SPECIMEN SOURCE: SIGNIFICANT CHANGE UP

## 2022-04-26 LAB
CULTURE RESULTS: SIGNIFICANT CHANGE UP
CULTURE RESULTS: SIGNIFICANT CHANGE UP
SPECIMEN SOURCE: SIGNIFICANT CHANGE UP
SPECIMEN SOURCE: SIGNIFICANT CHANGE UP

## 2022-04-28 DIAGNOSIS — K83.8 OTHER SPECIFIED DISEASES OF BILIARY TRACT: ICD-10-CM

## 2022-04-28 DIAGNOSIS — A41.9 SEPSIS, UNSPECIFIED ORGANISM: ICD-10-CM

## 2022-04-28 DIAGNOSIS — K76.0 FATTY (CHANGE OF) LIVER, NOT ELSEWHERE CLASSIFIED: ICD-10-CM

## 2022-04-28 DIAGNOSIS — A69.23 ARTHRITIS DUE TO LYME DISEASE: ICD-10-CM

## 2022-04-28 DIAGNOSIS — K80.62 CALCULUS OF GALLBLADDER AND BILE DUCT WITH ACUTE CHOLECYSTITIS WITHOUT OBSTRUCTION: ICD-10-CM

## 2022-04-28 DIAGNOSIS — R65.20 SEVERE SEPSIS WITHOUT SEPTIC SHOCK: ICD-10-CM

## 2022-05-02 PROCEDURE — 86803 HEPATITIS C AB TEST: CPT

## 2022-05-02 PROCEDURE — 86900 BLOOD TYPING SEROLOGIC ABO: CPT

## 2022-05-02 PROCEDURE — 86901 BLOOD TYPING SEROLOGIC RH(D): CPT

## 2022-05-02 PROCEDURE — 85610 PROTHROMBIN TIME: CPT

## 2022-05-02 PROCEDURE — 82247 BILIRUBIN TOTAL: CPT

## 2022-05-02 PROCEDURE — 76705 ECHO EXAM OF ABDOMEN: CPT

## 2022-05-02 PROCEDURE — 97530 THERAPEUTIC ACTIVITIES: CPT

## 2022-05-02 PROCEDURE — 85730 THROMBOPLASTIN TIME PARTIAL: CPT

## 2022-05-02 PROCEDURE — 86850 RBC ANTIBODY SCREEN: CPT

## 2022-05-02 PROCEDURE — 80048 BASIC METABOLIC PNL TOTAL CA: CPT

## 2022-05-02 PROCEDURE — U0005: CPT

## 2022-05-02 PROCEDURE — 76856 US EXAM PELVIC COMPLETE: CPT

## 2022-05-02 PROCEDURE — 87186 SC STD MICRODIL/AGAR DIL: CPT

## 2022-05-02 PROCEDURE — 82248 BILIRUBIN DIRECT: CPT

## 2022-05-02 PROCEDURE — 96375 TX/PRO/DX INJ NEW DRUG ADDON: CPT

## 2022-05-02 PROCEDURE — 96374 THER/PROPH/DIAG INJ IV PUSH: CPT | Mod: XU

## 2022-05-02 PROCEDURE — 85025 COMPLETE CBC W/AUTO DIFF WBC: CPT

## 2022-05-02 PROCEDURE — 87086 URINE CULTURE/COLONY COUNT: CPT

## 2022-05-02 PROCEDURE — U0003: CPT

## 2022-05-02 PROCEDURE — 84100 ASSAY OF PHOSPHORUS: CPT

## 2022-05-02 PROCEDURE — 36415 COLL VENOUS BLD VENIPUNCTURE: CPT

## 2022-05-02 PROCEDURE — 80053 COMPREHEN METABOLIC PANEL: CPT

## 2022-05-02 PROCEDURE — 74177 CT ABD & PELVIS W/CONTRAST: CPT | Mod: MA

## 2022-05-02 PROCEDURE — 99285 EMERGENCY DEPT VISIT HI MDM: CPT | Mod: 25

## 2022-05-02 PROCEDURE — C1889: CPT

## 2022-05-02 PROCEDURE — 83690 ASSAY OF LIPASE: CPT

## 2022-05-02 PROCEDURE — 83605 ASSAY OF LACTIC ACID: CPT

## 2022-05-02 PROCEDURE — 85027 COMPLETE CBC AUTOMATED: CPT

## 2022-05-02 PROCEDURE — C1769: CPT

## 2022-05-02 PROCEDURE — 81001 URINALYSIS AUTO W/SCOPE: CPT

## 2022-05-02 PROCEDURE — 80076 HEPATIC FUNCTION PANEL: CPT

## 2022-05-02 PROCEDURE — 83735 ASSAY OF MAGNESIUM: CPT

## 2022-05-02 PROCEDURE — 87040 BLOOD CULTURE FOR BACTERIA: CPT

## 2024-04-23 NOTE — PROVIDER CONTACT NOTE (OTHER) - BACKGROUND
Interval Chief Complaint: "I'm feeling good"  Interval History: Patient seen and evaluated on IPP. On approach calm and cooperative. No agitation or aggression noted. Per nursing report no acute events. Patient compliant with medication. Denies adverse reactions or side effects of medication, appears to be tolerating well. Patient visible on unit attending groups and engaging with peers. States improvement of paranoid thoughts. No delusional content noted. Denies suicidal or homicidal ideation. Denies A/V hallucinations. States she is eating well and sleeping well. Discussed coping skills. Does not warrant continued hospitalization. Patient does not present a risk to self or others at this time. Anticipated discharge tomorrow 4/24, patient verbalized understanding and agreeable with plan.    Mental Status Exam:  Level of Consciousness		Alert  General Appearance		No deformities present  Body Habitus			Overweight  Hygiene				Fair  Grooming			Fair  Behavior			Cooperative  Eye Contact			Good  Relatedness			Good  Impulse Control			Normal  Muscle Tone/Strength		Normal muscle tone/strength  Abnormal Movements		No abnormal movements  Gait/Station			Normal gait/station  Speech				Normal volume, rate, productivity, spontaneity, and articulation  Mood				Normal  Affect Quality			Euthymic  Affect Range			Full  Affect Congruence		Congruent  Thought Process		Linear  Thought Associations		Normal  Thought Content		Unremarkable  Perceptions			No abnormalities  Orientation			Oriented to time, place, person, situation  Attention/Concentration		Normal  Estimated Intelligence		Average  Recent Memory			Normal  Remote Memory		Normal  Fund of Knowledge		Normal  Language			No abnormalities noted  Judgement			Fair  Insight				Fair    Assessment:  Primary Diagnosis		Schizoaffective disorder, unspecified type    #Schizoaffective disorder  -Abilify increased to 20mg daily    -Hydroxyzine 50mg Q6 PRN for anxiety/insomnia  -Haldol 5mg Q6 PRN for agitation/psychosis  -Benadryl 50mg Q6 PRN got EPS  -Lorazepam 2mg Q6 PRN for aggression    #Aortic stenosis and history of valve replacement  -aspirin 81 milliGRAM(s) Oral daily    #HTN  -Norvasc    #DM  -Insulin sliding scale    -Tylenol PRN for pain    #Agitation  -for agitation not amenable to verbal redirection, may give haldol 5 mg q6h prn, ativan 2 mg q6h prn, benadryl 50 mg q6h prn with escalation to IM if pt is a danger to self or/and others with repeat EKG to ensure QTc <500 ms         Interval Chief Complaint: "I'm feeling good"  Interval History: Patient seen and evaluated on IPP. On approach calm and cooperative. No agitation or aggression noted. Per nursing report no acute events. Patient compliant with medication. Denies adverse reactions or side effects of medication, appears to be tolerating well. Patient visible on unit attending groups and engaging with peers. Denies any s/s of paranoia thoughts. No delusional content noted. Denies suicidal or homicidal ideation. Denies A/V hallucinations. States she is eating well and sleeping well. Discussed coping skills. Does not warrant continued hospitalization. Patient does not present a risk to self or others at this time. Anticipated discharge tomorrow 4/24, patient verbalized understanding and agreeable with plan.    Mental Status Exam:  Level of Consciousness		Alert  General Appearance		No deformities present  Body Habitus			Overweight  Hygiene				Fair  Grooming			Fair  Behavior			Cooperative  Eye Contact			Good  Relatedness			Good  Impulse Control			Normal  Muscle Tone/Strength		Normal muscle tone/strength  Abnormal Movements		No abnormal movements  Gait/Station			Normal gait/station  Speech				Normal volume, rate, productivity, spontaneity, and articulation  Mood				Normal  Affect Quality			Euthymic  Affect Range			Full  Affect Congruence		Congruent  Thought Process		Linear  Thought Associations		Normal  Thought Content		Unremarkable  Perceptions			No abnormalities  Orientation			Oriented to time, place, person, situation  Attention/Concentration		Normal  Estimated Intelligence		Average  Recent Memory			Normal  Remote Memory		Normal  Fund of Knowledge		Normal  Language			No abnormalities noted  Judgement			Fair  Insight				Fair    Assessment:  Primary Diagnosis		Schizoaffective disorder, unspecified type    #Schizoaffective disorder  -Abilify increased to 20mg daily    -Hydroxyzine 50mg Q6 PRN for anxiety/insomnia  -Haldol 5mg Q6 PRN for agitation/psychosis  -Benadryl 50mg Q6 PRN got EPS  -Lorazepam 2mg Q6 PRN for aggression    #Aortic stenosis and history of valve replacement  -aspirin 81 milliGRAM(s) Oral daily    #HTN  -Norvasc    #DM  -Insulin sliding scale    -Tylenol PRN for pain    #Agitation  -for agitation not amenable to verbal redirection, may give haldol 5 mg q6h prn, ativan 2 mg q6h prn, benadryl 50 mg q6h prn with escalation to IM if pt is a danger to self or/and others with repeat EKG to ensure QTc <500 ms         Pt refused vital signs beginning at 0200, MD Mccabe notified. Plan to have morning labs drawn and re-discuss if she still wanted to leave against medical advice. MD Mccabe notified at 0500 again.

## (undated) DEVICE — SPHINCTEROTOME CLEVERCUT WIRE 25MM  2.8MM X 170CM